# Patient Record
Sex: FEMALE | Race: WHITE | NOT HISPANIC OR LATINO | Employment: FULL TIME | URBAN - METROPOLITAN AREA
[De-identification: names, ages, dates, MRNs, and addresses within clinical notes are randomized per-mention and may not be internally consistent; named-entity substitution may affect disease eponyms.]

---

## 2017-01-26 ENCOUNTER — OFFICE VISIT (OUTPATIENT)
Dept: AUDIOLOGY | Facility: CLINIC | Age: 42
End: 2017-01-26

## 2017-01-26 DIAGNOSIS — H90.3 SENSORINEURAL HEARING LOSS, BILATERAL: Primary | ICD-10-CM

## 2017-01-26 NOTE — PROGRESS NOTES
AUDIOLOGY REPORT    BACKGROUND INFORMATION: Dr. Antwan Silva implanted Sarahi Vasquez with a left  Nucleus N22 cochlear implant (CI) on 11/13/2014 due to bilateral moderately-severe to profound sensorineural hearing loss and lack of benefit from hearing aids.  The patient is being seen for programming 1/26/2017 in Audiology at the [facility].    PATIENT REPORT: Patient reported that she has been hearing very well. She reported she recently switched to her back up processor and it sounds echoy and hurts her ear at times.     METHOD: Speech perception testing is conducted at regular intervals to determine the degree of benefit the patient is obtaining from the cochlear implant. Tests are conducted using the cochlear implant without the benefit of lipreading. All tests are conducted in a sound-treated room. Perception of monosyllabic words and words in sentences are tested. Results usually show improvement over time. A decrease in performance indicates the need for re-programming of the prosthesis and/or replacement of components.     INTERVAL: 2 years months     TEST RESULTS:  25 minutes were spent assessing the patient s auditory rehabilitation status.    Device(s) used for Testing:   Left ear: Cochlear N6 CI    Tympanograms: Normal eardrum mobility in the right ear, restricted (As) in the left ear.    Unaided thresholds: Moderately-severe to profound sensorineural hearing loss right ear. Left ear was not tested due to lack of measurable responses at last audiogram.    Soundfield Aided Thresholds: Aided thresholds in borderline normal hearing range.      CNC Words Test:  The patient repeats 25 single syllable words, auditory only. The words are presented at 60 dB SPL (conversational level) delivered from a CD player.    Preoperative Performance:  Left ear aided: Words= 4%, Phonemes= 21%  Right ear aided: Words= 4%, Phonemes= 19%    3 months Post-Activation of CI:  (4/2/15)  Left: Words= 72%, Phonemes= 84%    6 months Post-Activation of CI: (6/24/15)  Words= 76%, Phonemes= 87%    1 year Post-Activation of CI: (12/30/15)  Words= 84%, phonemes= 89%    2 years Post-Activation of CI: (today)  Words= 84%, phonemes= 92%    AzBio Sentences Test:  The patient repeats 20 sentences, auditory only.  The sentences are presented at 60 dB SPL (conversational level) delivered from a CD player.     Preoperative Performance:  Left ear aided: 4%  Right ear aided: 20%  Bilaterally aided: 43%, +10 dB SNR= 27%    3 months Post-Activation of CI: (4/2/15)  Left: 96%, +10 dB SNR= 83%    6 months Post-Activation of CI: (6/24/15)  Left: 99%, +10 dB SNR= 85%    1 year Post-Activation of CI: (12/30/15)  Left: 100%, +10 dB SNR= 79%    2 years Post-Activation of CI: (today)  Left: 95%, +10 dB SNR= 80%      FITTING SESSION: Dr. Antwan Silva, cochlear implant surgeon, ordered today's appointment. The patient came to the clinic for adjustment to the programs in the external speech processor and for assessment of the external components of the cochlear implant system. These components provide power and data to the internal device. Sound is only heard once the external portion is activated. Postoperative treatment, including device fitting and adjustment, audiologic assessments, and training are required at regular intervals. Testing can include electropsychophysical measures of threshold, comfort, and loudness balancing, which are completed to update the program.    Processor type:   Headpiece type:  Series  Magnet strength: 2    TEST RESULTS:   Electrode Impedances: stable and within tolerances  Neural Response Testing: Not tested today  Facial Stimulation: Absent  Tinnitus: Present  Balance Problems: Absent  Pain/Discomfort: Absent  Strategies Tried:   Strategy Preference:     Programs:  1. #15: Home (ASC + ADRO)  2. #15: Cafe (ASC + ADRO + Zoom)    Number of Channels per Program:  22    COMMENTS: I changed microphone covers on both processors. After doing so Sarahi reported they both had the echoy sound quality. I tilted C's down in the apical electrodes by 7 and decreased C's globally by 3. She reported the echo sound quality and discomfort were resolved. She reported good clarity and comfort with the volume.    Sarahi reported neither of her remotes are working. They are both under warranty. I submitted an RMA for both.      SUMMARY AND RECOMMENDATIONS: Sarahi was seen for cochlear implant programming and speech perception testing today. She will return in 12 months for programming, sooner if concerns arise. Please call this clinic with questions regarding these results or recommendations.    Kathie Wadsworth  Licensed Audiologist  [place] License #[id number]

## 2017-07-08 ENCOUNTER — HEALTH MAINTENANCE LETTER (OUTPATIENT)
Age: 42
End: 2017-07-08

## 2017-11-02 ENCOUNTER — CARE COORDINATION (OUTPATIENT)
Dept: SURGERY | Facility: CLINIC | Age: 42
End: 2017-11-02

## 2017-11-02 NOTE — PROGRESS NOTES
This chart was accessed as a result of referral that was sent to Dr. MARIA ANTONIA Tillman but was attached to the wrong medical record number.  The new patient  was notified and the correct chart was then attached for this referral.    Carol HAIRSTONN, HNBC, STAR-T  RN Care Coordinator  Surgical Oncology and GI service  Ph: [phone]  FAX: [phone]

## 2017-12-21 ENCOUNTER — OFFICE VISIT (OUTPATIENT)
Dept: AUDIOLOGY | Facility: CLINIC | Age: 42
End: 2017-12-21
Payer: COMMERCIAL

## 2017-12-21 DIAGNOSIS — H90.3 SENSORINEURAL HEARING LOSS, BILATERAL: Primary | ICD-10-CM

## 2017-12-21 NOTE — PROGRESS NOTES
AUDIOLOGY REPORT    BACKGROUND INFORMATION: Dr. Antwan Silva implanted Sarahi Vasquez with a left  Nucleus N22 cochlear implant (CI) on 11/13/2014 due to bilateral moderately-severe to profound sensorineural hearing loss and lack of benefit from hearing aids.  The patient is being seen for programming 12/21/2017 in Audiology at the [facility].    PATIENT REPORT: Patient reported that she has been hearing very well. She did not receive the repaired remote assistant as she had moved to a new address. She also reported that she needs a new size small earhook.    METHOD: Speech perception testing is conducted at regular intervals to determine the degree of benefit the patient is obtaining from the cochlear implant. Tests are conducted using the cochlear implant without the benefit of lipreading. All tests are conducted in a sound-treated room. Perception of monosyllabic words and words in sentences are tested. Results usually show improvement over time. A decrease in performance indicates the need for re-programming of the prosthesis and/or replacement of components.     INTERVAL: 3 years     TEST RESULTS:  25 minutes were spent assessing the patient s auditory rehabilitation status.    Device(s) used for Testing:   Left ear: Cochlear N6 CI    Tympanograms: Normal eardrum mobility in the right ear, restricted (As) in the left ear.    Unaided thresholds: Moderately-severe to profound sensorineural hearing loss right ear.     Soundfield Aided Thresholds: Aided thresholds in borderline normal hearing range.      CNC Words Test:  The patient repeats 25 single syllable words, auditory only. The words are presented at 60 dB SPL (conversational level) delivered from a CD player.    Preoperative Performance:  Left ear aided: Words= 4%, Phonemes= 21%  Right ear aided: Words= 4%, Phonemes= 19%    3 months Post-Activation of CI: (4/2/15)  Left: Words= 72%, Phonemes= 84%    6 months  Post-Activation of CI: (6/24/15)  Words= 76%, Phonemes= 87%    1 year Post-Activation of CI: (12/30/15)  Words= 84%, phonemes= 89%    2 years Post-Activation of CI: (1/26/17)  Words= 84%, phonemes= 92%    3 years Post-Activation of CI: (today)  Words= 92%, phonemes= 97%    AzBio Sentences Test:  The patient repeats 20 sentences, auditory only.  The sentences are presented at 60 dB SPL (conversational level) delivered from a CD player.     Preoperative Performance:  Left ear aided: 4%  Right ear aided: 20%  Bilaterally aided: 43%, +10 dB SNR= 27%    3 months Post-Activation of CI: (4/2/15)  Left: 96%, +10 dB SNR= 83%    6 months Post-Activation of CI: (6/24/15)  Left: 99%, +10 dB SNR= 85%    1 year Post-Activation of CI: (12/30/15)  Left: 100%, +10 dB SNR= 79%    2 years Post-Activation of CI: (1/26/17)  Left: 95%, +10 dB SNR= 80%    3 years Post-Activation of CI: (today)  Left: 96%, +10 dB SNR= 90%      FITTING SESSION: Dr. Antwan Silva, cochlear implant surgeon, ordered today's appointment. The patient came to the clinic for adjustment to the programs in the external speech processor and for assessment of the external components of the cochlear implant system. These components provide power and data to the internal device. Sound is only heard once the external portion is activated. Postoperative treatment, including device fitting and adjustment, audiologic assessments, and training are required at regular intervals. Testing can include electropsychophysical measures of threshold, comfort, and loudness balancing, which are completed to update the program.    Processor type:   Headpiece type:  Series  Magnet strength: 2    TEST RESULTS:   Electrode Impedances: stable and within tolerances  Neural Response Testing: Not tested today  Facial Stimulation: Absent  Tinnitus: Present  Balance Problems: Absent  Pain/Discomfort: Absent  Strategies Tried:   Strategy Preference:     Programs:  1. #15:  Home (ASC + ADRO)  2. #15: Cafe (ASC + ADRO + Zoom)    Number of Channels per Program: 22    COMMENTS: I changed microphone covers on both processors.    Due to speech perception results and patient request, no programming changes were made to her processor today. She reports excellent sound quality.      SUMMARY AND RECOMMENDATIONS: Sarahi was seen for cochlear implant programming and speech perception testing today. She will return in 12 months for programming, sooner if concerns arise. Please call this clinic with questions regarding these results or recommendations.    Kathie Wadsworth  Licensed Audiologist  [place] License #[id number]

## 2017-12-21 NOTE — MR AVS SNAPSHOT
After Visit Summary   12/21/2017    Sarahi Vasquez    MRN: [id number]           Patient Information     Date Of Birth          1975        Visit Information        Provider Department      12/21/2017 10:00 AM Meg Haley, [facility] Audiology        Today's Diagnoses     Sensorineural hearing loss, bilateral    -  1       Follow-ups after your visit        Who to contact     Please call your clinic at [phone] to:    Ask questions about your health    Make or cancel appointments    Discuss your medicines    Learn about your test results    Speak to your doctor   If you have compliments or concerns about an experience at your clinic, or if you wish to file a complaint, please contact [facility] Patient Relations at [phone] or email us at [email or web address]         Additional Information About Your Visit        MyChart Information     [employer] gives you secure access to your electronic health record. If you see a primary care provider, you can also send messages to your care team and make appointments. If you have questions, please call your primary care clinic.  If you do not have a primary care provider, please call [phone] and they will assist you.      [employer]Card is an electronic gateway that provides easy, online access to your medical records. With [employer], you can request a clinic appointment, read your test results, renew a prescription or communicate with your care team.     To access your existing account, please contact your [facility] or call [phone] for assistance.        Care EveryWhere ID     This is your Care EveryWhere ID. This could be used by other organizations to access your [facility] medical records  [id number]         Blood Pressure from Last 3 Encounters:   11/13/14 129/82    Weight from Last 3 Encounters:   11/13/14 68.1 kg (150 lb 2.1 oz)              We Performed the Following      AUDIOGRAM/TYMPANOGRAM - INTERFACE     Audiometry/Air   (06306)     Eval of Aural Rehab Status (less than 31 min) (79687)     LT: Diagnostic Analysis of CI 7 yrs & over, Subsequent Programming   (42737)     Tympanometry (impedance - testing) (52652)        Primary Care Provider Office Phone # Fax #    Trey Ram [phone]       [place] [address]        Equal Access to Services     TIM LIU AH: Hadii aad ku hadasho Soomaali, waaxda luqadaha, qaybta kaalmada adeegyada, waxay idiin hayaan adeeg kharash la'aan ah. So [facility] [phone].    ATENCIÓN: Si habla español, tiene a [place] disposición servicios gratuitos de asistencia lingüística. Ll[facility]zer [facility] System [phone].    We comply with applicable federal civil rights laws and [place] laws. We do not discriminate on the basis of race, color, national origin, age, disability, sex, sexual orientation, or gender identity.            Thank you!     Thank you for choosing [facility] AUDIOLOGY  for your care. Our goal is always to provide you with excellent care. Hearing back from our patients is one way we can continue to improve our services. Please take a few minutes to complete the written survey that you may receive in the mail after your visit with us. Thank you!             Your Updated Medication List - Protect others around you: Learn how to safely use, store and throw away your medicines at www.disposemymeds.org.          This list is accurate as of: 12/21/17  3:35 PM.  Always use your most recent med list.                   Brand Name Dispense Instructions for use Diagnosis    fish oil-omega-3 fatty acids 1000 MG capsule      Take 2 g by mouth daily.        IBUPROFEN      as needed.        IRON SUPPLEMENT PO           LEXAPRO 20 MG tablet   Generic drug:  escitalopram      Take 20 mg by mouth daily.        MULTIVITAMINS PO      Take  by mouth daily.        nicotine      Place onto the skin every 8 hours         ondansetron 4 MG ODT tab    ZOFRAN ODT    20 tablet    Take 1-2 tablets (4-8 mg) by mouth every 8 hours as needed for nausea    Post-operative state       WELLBUTRIN PO

## 2018-12-26 ENCOUNTER — OFFICE VISIT (OUTPATIENT)
Dept: AUDIOLOGY | Facility: CLINIC | Age: 43
End: 2018-12-26
Payer: COMMERCIAL

## 2018-12-26 DIAGNOSIS — H90.3 SENSORINEURAL HEARING LOSS, BILATERAL: Primary | ICD-10-CM

## 2018-12-26 NOTE — PROGRESS NOTES
AUDIOLOGY REPORT    BACKGROUND INFORMATION: Dr. Antwan Silva implanted Sarahi Vasquez with a left  Nucleus N22 cochlear implant (CI) on 11/13/2014 due to bilateral moderately-severe to profound sensorineural hearing loss and lack of benefit from hearing aids.  The patient is being seen for programming 12/26/18 in Audiology at the [facility].    PATIENT REPORT: Patient reported that she has been doing very well with her cochlear implant. She does not have any specific complains regarding sound quality or volume. She is wearing is full time.       FITTING SESSION: Dr. Antwan Silva, cochlear implant surgeon, ordered today's appointment. The patient came to the clinic for adjustment to the programs in the external speech processor and for assessment of the external components of the cochlear implant system. These components provide power and data to the internal device. Sound is only heard once the external portion is activated. Postoperative treatment, including device fitting and adjustment, audiologic assessments, and training are required at regular intervals. Testing can include electropsychophysical measures of threshold, comfort, and loudness balancing, which are completed to update the program.    Processor type:   Headpiece type:  Series  Magnet strength: 2    TEST RESULTS:   Electrode Impedances: stable and within tolerances  Neural Response Testing: Not tested today  Facial Stimulation: Absent  Tinnitus: Present  Balance Problems: Absent  Pain/Discomfort: Absent  Strategies Tried:   Strategy Preference:     Programs:  1. #16: Home (ASC + ADRO)  2. #16: Cafe (ASC + ADRO + Zoom)    Number of Channels per Program: 22    COMMENTS: I changed microphone covers on both processors.    Patient's appointment was scheduled for 30 minutes today therefore no speech perception testing was completed. After checking compliance, multiple electrodes were  out of compliance therefore pulse width was widened to 37 and T levels were remeasured using detection and c levels were measured using loudness scaling. Patient was able to complete task successfully. In live voice she reported comfort with the volume and sound quality. All electrodes were back in compliance.      SUMMARY AND RECOMMENDATIONS: Sarahi was seen for cochlear implant programming and speech perception testing today. She will return in 12 months for programming, sooner if concerns arise. Please call this clinic with questions regarding these results or recommendations.    Kathie Wadsworth, [facility]  Licensed Audiologist  [place] License #[id number]

## 2019-11-02 ENCOUNTER — HEALTH MAINTENANCE LETTER (OUTPATIENT)
Age: 44
End: 2019-11-02

## 2020-02-21 ENCOUNTER — DOCUMENTATION ONLY (OUTPATIENT)
Dept: OTOLARYNGOLOGY | Facility: CLINIC | Age: 45
End: 2020-02-21

## 2020-03-19 ENCOUNTER — TELEPHONE (OUTPATIENT)
Dept: AUDIOLOGY | Facility: CLINIC | Age: 45
End: 2020-03-19

## 2020-03-19 NOTE — TELEPHONE ENCOUNTER
Reached out to patient regarding upcoming appointment. Informed them that per leadership it is in their best interest to reschedule appointment until after 4/12 and gave the appointment line to reschedule.     Kathie Wadsworth, [facility]  Licensed Audiologist  [place] License #[id number]

## 2020-07-08 ENCOUNTER — OFFICE VISIT (OUTPATIENT)
Dept: AUDIOLOGY | Facility: CLINIC | Age: 45
End: 2020-07-08
Payer: COMMERCIAL

## 2020-07-08 DIAGNOSIS — H90.3 SENSORINEURAL HEARING LOSS, BILATERAL: Primary | ICD-10-CM

## 2020-07-08 NOTE — PROGRESS NOTES
"AUDIOLOGY REPORT    BACKGROUND INFORMATION: Dr. Antwan Silva implanted Sarahi Vasquez with a left  Nucleus N22 cochlear implant (CI) on 11/13/2014 due to bilateral moderately-severe to profound sensorineural hearing loss and lack of benefit from hearing aids.  The patient is being seen to upgrade her external equipment on 7/8/2020 in Audiology at the [facility].    PATIENT REPORT: Patient reported that she received her left N7 upgrade kit in the mail. She has not had any issues with sound quality or volume with current map.    FITTING SESSION: Dr. Antwan Silva, cochlear implant surgeon, ordered today's appointment. The patient came to the clinic for adjustment to the programs in the external speech processor and for assessment of the external components of the cochlear implant system. These components provide power and data to the internal device. Sound is only heard once the external portion is activated. Postoperative treatment, including device fitting and adjustment, audiologic assessments, and training are required at regular intervals. Testing can include electropsychophysical measures of threshold, comfort, and loudness balancing, which are completed to update the program.    Processor type: [id number]  Headpiece type: [id number] Series  Magnet strength: 2    TEST RESULTS:   Electrode Impedances: stable and within tolerances  Neural Response Testing: Not tested today  Facial Stimulation: Absent  Tinnitus: Present  Balance Problems: Absent  Pain/Discomfort: Absent  Strategies Tried:   Strategy Preference:     Programs:  1. #16: Home (ASC + ADRO)  2. #16: Cafe (ASC + ADRO + Zoom)  3. #16: Scan    Number of Channels per Program: 22    COMMENTS: I converted her previous map to be used with the N7 and she reported that sound quality was \"sharper\" but was happy with the change. We paired the processor to her phone and I explained using Scan and Front Focus if " she chooses.     SUMMARY AND RECOMMENDATIONS: Sarahi was seen to update her external equipment today. She will return in 6 weeks for a check, or in 12 months for programming if no concerns arise, sooner if concerns arise. Please call this clinic with questions regarding these results or recommendations.    Kathie Wadsworth, [facility]  Licensed Audiologist  [place] License #[id number]

## 2020-11-16 ENCOUNTER — HEALTH MAINTENANCE LETTER (OUTPATIENT)
Age: 45
End: 2020-11-16

## 2021-09-12 ENCOUNTER — HEALTH MAINTENANCE LETTER (OUTPATIENT)
Age: 46
End: 2021-09-12

## 2021-11-03 DIAGNOSIS — H90.3 SENSORINEURAL HEARING LOSS, BILATERAL: Primary | ICD-10-CM

## 2021-11-05 ENCOUNTER — TELEPHONE (OUTPATIENT)
Dept: AUDIOLOGY | Facility: CLINIC | Age: 46
End: 2021-11-05

## 2022-01-02 ENCOUNTER — HEALTH MAINTENANCE LETTER (OUTPATIENT)
Age: 47
End: 2022-01-02

## 2022-01-20 ENCOUNTER — OFFICE VISIT (OUTPATIENT)
Dept: AUDIOLOGY | Facility: CLINIC | Age: 47
End: 2022-01-20
Payer: COMMERCIAL

## 2022-01-20 DIAGNOSIS — H90.3 SENSORINEURAL HEARING LOSS, BILATERAL: Primary | ICD-10-CM

## 2022-01-20 PROCEDURE — 92604 REPROGRAM COCHLEAR IMPLT 7/>: CPT | Performed by: AUDIOLOGIST

## 2022-01-20 PROCEDURE — 92626 EVAL AUD FUNCJ 1ST HOUR: CPT | Mod: 59 | Performed by: AUDIOLOGIST

## 2022-01-20 PROCEDURE — 92567 TYMPANOMETRY: CPT | Mod: 59 | Performed by: AUDIOLOGIST

## 2022-01-20 NOTE — PROGRESS NOTES
AUDIOLOGY REPORT    BACKGROUND INFORMATION: Dr. Antwan Silva implanted Sarahi Vasquez with a left Cochlear [employer]us N22 cochlear implant (CI) on 11/13/2014 due to bilateral moderately severe to profound sensorineural hearing loss and lack of benefit from hearing aids. The patient is being seen for audiologic evaluation, speech perception testing, and CI follow-up programing on 1/20/2022 in the Audiology Clinic at [facility].     PATIENT REPORT: Sarahi reports that she loves the N7 sound processor. She does note that sounds will intermittently get too loud and echo-y. She also reports her battery life has seemed to diminish some.     METHOD: Speech perception testing is conducted at regular intervals to determine the degree of benefit the patient is obtaining from the CI. Tests are conducted using the CI without the benefit of lipreading. All tests are conducted in a sound-treated room. Perception of monosyllabic words and words in sentences are tested. Results usually show improvement over time. A decrease in performance indicates the need for re-programming of the prosthesis and/or replacement of components.     INTERVAL: 7 years    TEST RESULTS: 40 minutes were spent assessing the patient s auditory rehabilitation status.    Devices used for Testing:   Right ear: N/A, as the patient does not wear a hearing aid  Left ear: N7 sound processor    Tympanograms: Within normal limits bilaterally    Unaided Thresholds: Moderately severe sloping to profound sensorineural hearing loss for the right ear. Did not test the left ear due to previous no response    Soundfield Aided Thresholds: Detection in the normal to mild range    CNC Words Test:  The patient repeats 25 single syllable words, auditory only. The words are presented at 60 dB SPL (conversational level) delivered from a CD player.    Preoperative Performance:  Left: 4% words, 21% phonemes  Right: 4% words,  19% phonemes    3 months Post-Activation of CI:   Left: 72% words, 84% phonemes    6 months Post-Activation of CI:  Left: 76% words, 87% phonemes    1 year Post-Activation of CI:  Left: 84% words, 89% phonemes    2 years Post-Activation of CI:  Left: 84% words, 92% phonemes    3 years Post-Activation of CI:  Left: 92% words, 97% phonemes    7 years Post-Activation of CI:  Left: 88% words, 95% phonemes    AzBio Sentences Test:  The patient repeats 20 sentences, auditory only. The sentences are presented at 60 dB SPL (conversational level) delivered from a CD player.     Preoperative Performance:  Left: 4%  Right: 20%  Bilateral: 43% in quiet, 27% in +10 dB SNR background babble    3 months Post-Activation of CI:   Left: 96% in quiet, 83% in +10 dB SNR background babble    6 months Post-Activation of CI:  Left: 99% in quiet, 83% in +10 dB SNR background babble    1 year Post-Activation of CI:  Left: 100% in quiet, 79% in +10 dB SNR background babble    2 years Post-Activation of CI:  Left: 95% in quiet, 80% in +10 dB SNR background babble    3 years Post-Activation of CI:  Left: 96% in quiet, 90% in +10 dB SNR background babble    7 years Post-Activation of CI:  Left: 95% in quiet, 67% in +10 dB SNR background babble pre-programming and 76% in +10 dB SNR background babble post-programming    FITTING SESSION: Dr. Mica Sherman, CI surgeon, ordered today's appointment. The patient came to the clinic for adjustment to the programs in the external speech processor and for assessment of the external components of the CI system. These components provide power and data to the internal device. Sound is only heard once the external portion is activated. Postoperative treatment, including device fitting and adjustment, audiologic assessments, and training are required at regular intervals. Testing can include electropsychophysical measures of threshold, comfort, and loudness balancing, which are completed to update the  program.    Processor type: N7  Magnet strength: 2 - No irritation    TEST RESULTS:   Electrode Impedances: Stable and within tolerances  Neural Response Testing: Did not test  Facial Stimulation: Absent  Tinnitus: Present  Balance Problems: Absent  Pain/Discomfort: Absent  Strategies Tried:  with pulse width of 62    Programs (MAP 17):  1. Home  2. Cafe  3. SCAN    Number of Channels per Program: 22    COMMENTS: Unaided thresholds were stable for the right ear compared to the patient's previous audiogram dated 12/21/2017. Audibility was good. Speech perception scores were stable, with the exception of a slight decrease in performance on AzBio in +10 dB SNR background babble.     A few of the mid-frequency electrodes were out of compliance. Therefore, pulse width was changed to 62. Threshold (T) levels and comfort (C) levels were re-measured. T levels decreased slightly and C levels increased slightly across the electrode array. Upon going live, Sarahi reported good volume and sound quality.     Confirmed audibility remained good post-programming. AzBio in +10 dB SNR background babble was completed again post-programming and scores improved some.     SUMMARY AND RECOMMENDATIONS: Sarahi was seen for audiologic evaluation, speech perception testing, and CI follow-up programing today. She will return annually, sooner if concerns arise. Please call this clinic with questions regarding these results or recommendations.      Lc Scherer, [facility]  Licensed Audiologist  [place] #[id number]

## 2022-11-19 ENCOUNTER — HEALTH MAINTENANCE LETTER (OUTPATIENT)
Age: 47
End: 2022-11-19

## 2022-12-18 ENCOUNTER — HEALTH MAINTENANCE LETTER (OUTPATIENT)
Age: 47
End: 2022-12-18

## 2023-01-16 DIAGNOSIS — H90.3 SENSORINEURAL HEARING LOSS, BILATERAL: Primary | ICD-10-CM

## 2023-02-06 NOTE — PROGRESS NOTES
AUDIOLOGY REPORT    BACKGROUND INFORMATION: Dr. Antwan Silva implanted Sarahi Vasquez ([place] - patient reports she will be changing her last name at checkout today) with a left Cochlear [employer] Nucle[employer]  (Note: previous clinic notes listed N22 device in error) cochlear implant (CI) on 11/13/2014 due to bilateral moderately severe to profound sensorineural hearing loss and lack of benefit from hearing aids. The patient is being seen for audiologic evaluation, speech perception testing, and CI follow-up on 2/16/2023 in the Audiology Clinic at [facility].     Dr. Mica Sherman ordered today's visit.      PATIENT REPORT: Sarahi reports she is doing well with her N7 processor and has no new concerns.      It was noted that the cable/coil rubber was .  Since it is under warranty, an RMA was submitted and replacement will ship to the patient's home.      METHOD: Speech perception testing is conducted at regular intervals to determine the degree of benefit the patient is obtaining from the CI. Tests are conducted using the CI without the benefit of lipreading. All tests are conducted in a sound-treated room. Perception of monosyllabic words and words in sentences are tested. Results usually show improvement over time. A decrease in performance indicates the need for re-programming of the prosthesis and/or replacement of components.     INTERVAL: 8 years    TEST RESULTS: 40 minutes were spent assessing the patient s auditory rehabilitation status.    Devices used for Testing:   Right ear: N/A, as the patient does not wear a hearing aid  Left ear: N7 sound processor with #2 magnet (no irritation or discomfort)  *Microphone cover was removed for testing since the patient has never changed this.  She was advised on how to change this at home.      Tympanograms:   Right normal  Left slightly shallow, consistent with past recordings and no otologic symptoms      Unaided Thresholds: Severe sloping to profound hearing loss for the right ear - stable re: 1/20/22; Known sensorineural - Did not retest bone since there was no response in past except at 75 dB at 1000 Hz;  Did not test the left ear due to previous no response    Soundfield Aided Thresholds with left CI: Detection in borderline normal to mild range - stable     CNC Words Test:  The patient repeats 25 single syllable words, auditory only. The words are presented at 60 dB SPL (conversational level) delivered from a CD player.    Preoperative Performance:  Left: 4% words, 21% phonemes  Right: 4% words, 19% phonemes    3 months Post-Activation of CI:   Left: 72% words, 84% phonemes    6 months Post-Activation of CI:  Left: 76% words, 87% phonemes    1 year Post-Activation of CI:  Left: 84% words, 89% phonemes    2 years Post-Activation of CI:  Left: 84% words, 92% phonemes    3 years Post-Activation of CI:  Left: 92% words, 97% phonemes    7 years Post-Activation of CI:  Left: 88% words, 95% phonemes    8 years Post-Activation of left CI (today):  Left: 88% words - stable     AzBio Sentences Test:  The patient repeats 20 sentences, auditory only. The sentences are presented at 60 dB SPL (conversational level) delivered from a CD player.     Preoperative Performance:  Left: 4%  Right: 20%  Bilateral: 43% in quiet, 27% in +10 dB SNR background babble    3 months Post-Activation of CI:   Left: 96% in quiet, 83% in +10 dB SNR background babble    6 months Post-Activation of CI:  Left: 99% in quiet, 83% in +10 dB SNR background babble    1 year Post-Activation of CI:  Left: 100% in quiet, 79% in +10 dB SNR background babble    2 years Post-Activation of CI:  Left: 95% in quiet, 80% in +10 dB SNR background babble    3 years Post-Activation of CI:  Left: 96% in quiet, 90% in +10 dB SNR background babble    7 years Post-Activation of CI:  Left: 95% in quiet, 67% in +10 dB SNR background babble pre-programming and 76% in  +10 dB SNR background babble post-programming    8 years Post-Activation of left CI (today):  Left: 99% in quiet, 75% in +10 dB SNR background babble - both stable     FITTING SESSION: Dr. Mica Sherman ordered today's appointment. The patient came to the clinic for adjustment to the programs in the external speech processor and for assessment of the external components of the CI system. These components provide power and data to the internal device. Sound is only heard once the external portion is activated. Postoperative treatment, including device fitting and adjustment, audiologic assessments, and training are required at regular intervals. Testing can include electropsychophysical measures of threshold, comfort, and loudness balancing, which are completed to update the program.    Processor type: N7  Magnet strength: 2 - No irritation    TEST RESULTS:   Electrode Impedances: Stable and within tolerances  Dataloggin.4 hours of use per day   Neural Response Testing: Did not test  Facial Stimulation: Absent  Tinnitus: Present  Balance Problems: Absent  Pain/Discomfort: Absent  Strategies Tried:  with pulse width of 62    Programs (MAP 17):  1. Home  2. Cafe  3. SCAN    Number of Channels per Program: 22    COMMENTS: Impedances were stable.  Compliance was maintained in all programs.  Based on this and the flores testing results, no programming changes were recommended.  Since only datalogging and impedances were checked, programming charges were modified.      SUMMARY AND RECOMMENDATIONS: Sarahi was seen for audiologic evaluation, speech perception testing, and CI follow-up today. Audibility and speech perception testing is stable with the left CI. Impedances were stable and maps remained in compliance. Patient should return in 1 year for follow up testing or sooner if concerns.  While scheduling that at checkout today, she will update her last name with the clinic.  She will also contact [employer] to update her last name with them and she will change her processor microphone cover upon returning home.      Unaided hearing is stable in the right ear.  Tympanograms were consistent with past recordings (right normal, left slightly shallow without otologic symptoms). Patient will follow up in ENT if any medical concerns arise with her ears or cochlear implant.     The patient expressed understanding and agreement with this plan.    Lc Delcid, CCC-A, [facility]  Licensed Audiologist  [place] #[id number]    Enclosure: audiogram

## 2023-02-16 ENCOUNTER — OFFICE VISIT (OUTPATIENT)
Dept: AUDIOLOGY | Facility: CLINIC | Age: 48
End: 2023-02-16
Payer: COMMERCIAL

## 2023-02-16 DIAGNOSIS — H90.3 SENSORINEURAL HEARING LOSS, BILATERAL: Primary | ICD-10-CM

## 2023-02-16 PROCEDURE — 92567 TYMPANOMETRY: CPT | Mod: 59 | Performed by: AUDIOLOGIST-HEARING AID FITTER

## 2023-02-16 PROCEDURE — 92626 EVAL AUD FUNCJ 1ST HOUR: CPT | Mod: 59 | Performed by: AUDIOLOGIST-HEARING AID FITTER

## 2023-02-16 PROCEDURE — 92604 REPROGRAM COCHLEAR IMPLT 7/>: CPT | Performed by: AUDIOLOGIST-HEARING AID FITTER

## 2024-01-28 ENCOUNTER — HEALTH MAINTENANCE LETTER (OUTPATIENT)
Age: 49
End: 2024-01-28

## 2024-02-22 ENCOUNTER — OFFICE VISIT (OUTPATIENT)
Dept: AUDIOLOGY | Facility: CLINIC | Age: 49
End: 2024-02-22
Payer: COMMERCIAL

## 2024-02-22 DIAGNOSIS — H90.3 SENSORINEURAL HEARING LOSS, BILATERAL: Primary | ICD-10-CM

## 2024-02-22 PROCEDURE — 92567 TYMPANOMETRY: CPT | Mod: 59 | Performed by: AUDIOLOGIST

## 2024-02-22 PROCEDURE — 92604 REPROGRAM COCHLEAR IMPLT 7/>: CPT | Mod: 52 | Performed by: AUDIOLOGIST

## 2024-02-22 PROCEDURE — 92626 EVAL AUD FUNCJ 1ST HOUR: CPT | Mod: 59 | Performed by: AUDIOLOGIST

## 2024-02-22 NOTE — PROGRESS NOTES
AUDIOLOGY REPORT    SUBJECTIVE:   Dr. Antwan Silva implanted Sarahi Sofia with a left Cochlear [employer] Nucleus  (Note: previous clinic notes listed N22 device in error) cochlear implant (CI) on 11/13/2014 due to bilateral moderately severe to profound sensorineural hearing loss and lack of benefit from hearing aids. The patient is being seen for speech perception testing and cochlear implant programming on 2/22/24 in the Audiology Clinic at [facility].     Venus reports she is doing well with her processors. She had to have it replaced one time a few months ago after she washed her processor. Today she expressed interest in implanting her second side as she feels she is missing out on a lot in her environment and her kids are older now.    OBJECTIVE:   Speech perception testing is conducted at regular intervals to determine the degree of benefit the patient is obtaining from the CI. Tests are conducted using the CI without the benefit of lipreading. All tests are conducted in a sound-treated room. Perception of monosyllabic words and words in sentences are tested. Results usually show improvement over time. A decrease in performance indicates the need for re-programming of the prosthesis and/or replacement of components.     INTERVAL: 9 years    TEST RESULTS: 40 minutes were spent assessing the patient s auditory rehabilitation status.    Devices used for Testing:   Right ear: [facility] [employer] Link M hearing aid with comply tip. Hearing aid was programmed using simulated real ear measurements to NAL-NL1 prescriptive targets  Left ear: N7     Tympanograms:   Right: Normal  Left: Restricted mobility consistent with past recordings and no otologic symptoms     Unaided Thresholds: Severe sloping to profound hearing loss for the right ear - stable re: 1/20/22; Known sensorineural - Did not retest bone since there was no response in past except at 75 dB at 1000  Hz;  Did not test the left ear due to previous no response    Soundfield Aided Thresholds with left CI: Detection in borderline normal to mild range - stable     CNC Words Test:  The patient repeats 25 single syllable words, auditory only. The words are presented at 60 dB SPL (conversational level) delivered from a CD player.    Preoperative Performance:  Left: 4% words, 21% phonemes  Right: 4% words, 19% phonemes    7 years Post-Activation of CI:  Left: 88% words, 95% phonemes    8 years Post-Activation of left CI (2/16/23)  Left: 88% words - stable       9 years Post-Activation of left CI (today):  Left CI: words: 88%, phonemes: 95%  Right HA: words: 4%, phonemes: 17%    AzBio Sentences Test:  The patient repeats 20 sentences, auditory only. The sentences are presented at 60 dB SPL (conversational level) delivered from a CD player.     Preoperative Performance:  Left: 4%  Right: 20%  Bilateral: 43% in quiet, 27% in +10 dB SNR background babble    7 years Post-Activation of CI:  Left: 95% in quiet, 67% in +10 dB SNR background babble pre-programming and 76% in +10 dB SNR background babble post-programming    8 years Post-Activation of left CI (2/16/23):  Left: 99% in quiet, 75% in +10 dB SNR background babble      9 years Post-Activation of left CI (today):  Left: 95%  Right HA: 9%  Bimodal: 93%, +10 dB SNR: 89%    FITTING SESSION: Dr. Mica Sherman ordered today's appointment. The patient came to the clinic for adjustment to the programs in the external speech processor and for assessment of the external components of the CI system. These components provide power and data to the internal device. Sound is only heard once the external portion is activated. Postoperative treatment, including device fitting and adjustment, audiologic assessments, and training are required at regular intervals. Testing can include electropsychophysical measures of threshold, comfort, and loudness balancing, which are completed to update  the program.    Processor type: N7  Magnet strength: 2 - No irritation    TEST RESULTS:   Electrode Impedances: Stable and within tolerances  Dataloggin.4 hours of use per day   Neural Response Testing: Did not test  Facial Stimulation: Absent  Tinnitus: Present  Balance Problems: Absent  Pain/Discomfort: Absent  Strategies Tried:  with pulse width of 62    Programs (MAP 17):  1. Home  2. Cafe  3. SCAN    Number of Channels per Program: 22    COMMENTS: Impedances were stable.  Compliance was maintained in all programs.  Based on this and the flores testing results, no programming changes were recommended.      We discussed expectations around implanting her second side. I explained that progress may be different than her first side. Unlike with her first side when we encouraged discontinuing use of the hearing aid in the other ear, she should continue to wear both processors full time after activation of the new one, however dedicated listening practice of the new ear should be done daily in the first few months in order to optimize performance. We also discussed that sound quality may be different with second side which is normal. She expressed understanding.    ASSESSMENT:   Speech perception testing, aided testing and modified cochlear implant programming completed today. Patient is a candidate for cochlear implantation in the right ear and performance in left CI is stable.    PLAN:   Sarahi has been diagnosed with a bilateral sensorineural hearing loss and is a candidate for cochlear implantation in the right ear ear. It is recommended that they return to complete the remainder of the team evaluation; CT scan and surgeon visit. Please contact the clinic at [phone] with questions regarding these results or recommendations.    Kathie Wadsworth, [facility] [place]  Licensed Audiologist  [place] License #[id number]

## 2024-03-01 ENCOUNTER — TELEPHONE (OUTPATIENT)
Dept: AUDIOLOGY | Facility: CLINIC | Age: 49
End: 2024-03-01
Payer: COMMERCIAL

## 2024-03-01 NOTE — TELEPHONE ENCOUNTER
FUTURE VISIT INFORMATION      FUTURE VISIT INFORMATION:  Date: 4/18/24  Time: 1 PM  Location: [facility]  REFERRAL INFORMATION:  Referring provider:    Referring providers clinic:    Reason for visit/diagnosis:  Discuss 2nd CI     RECORDS REQUESTED FROM      Clinic name Comments Records Status Imaging Status   [facility] 2/22/2024 OV with Meg Haley AuD   2/22/2024 audiogram  [id number] audiogram    12/5/2014 OV with Antwan Silva MD    [facility]    Procedure [facility] 11/13/2014 Left Cochlear Allie Implant with Antwan Silva MD   [facility] 9/17/2014 CT temporal Epic PACS

## 2024-03-01 NOTE — TELEPHONE ENCOUNTER
Spoke with Venus, scheduled appt and noted I would send her contact info to Zhane North with [employer] to discuss ordering device accessories    -Briana SHAVER  1:09pm  ___    LVM for patient with direct # to schedule for Dr. Sherman/CI discussion.    -Briana SHAVER 3/1/24

## 2024-03-01 NOTE — TELEPHONE ENCOUNTER
[facility] Call Center    Phone Message    May a detailed message be left on voicemail: yes     Reason for Call: Per pt she is wondering about getting her surgery scheduled. Please call to discuss. Thank you    Action Taken: Message routed to:  [facility] ([facility]): AUDIO    Travel Screening: Not Applicable

## 2024-04-18 ENCOUNTER — PRE VISIT (OUTPATIENT)
Dept: OTOLARYNGOLOGY | Facility: CLINIC | Age: 49
End: 2024-04-18

## 2024-04-18 ENCOUNTER — OFFICE VISIT (OUTPATIENT)
Dept: OTOLARYNGOLOGY | Facility: CLINIC | Age: 49
End: 2024-04-18
Payer: COMMERCIAL

## 2024-04-18 VITALS
DIASTOLIC BLOOD PRESSURE: 71 MMHG | OXYGEN SATURATION: 95 % | TEMPERATURE: 97.8 F | WEIGHT: 198 LBS | HEIGHT: 65 IN | HEART RATE: 78 BPM | BODY MASS INDEX: 32.99 KG/M2 | SYSTOLIC BLOOD PRESSURE: 103 MMHG

## 2024-04-18 DIAGNOSIS — H90.3 SENSORINEURAL HEARING LOSS, BILATERAL: Primary | ICD-10-CM

## 2024-04-18 PROCEDURE — 99203 OFFICE O/P NEW LOW 30 MIN: CPT | Performed by: OTOLARYNGOLOGY

## 2024-04-18 RX ORDER — ROPINIROLE 0.5 MG/1
TABLET, FILM COATED ORAL
COMMUNITY

## 2024-04-18 RX ORDER — ACETAMINOPHEN 325 MG/1
975 TABLET ORAL ONCE
Status: CANCELLED | OUTPATIENT
Start: 2024-04-18 | End: 2024-04-18

## 2024-04-18 RX ORDER — TRAZODONE HYDROCHLORIDE 100 MG/1
TABLET ORAL
COMMUNITY

## 2024-04-18 RX ORDER — FLUOXETINE 40 MG/1
CAPSULE ORAL
COMMUNITY

## 2024-04-18 RX ORDER — CEFAZOLIN SODIUM 2 G/50ML
2 SOLUTION INTRAVENOUS
Status: CANCELLED | OUTPATIENT
Start: 2024-04-18

## 2024-04-18 RX ORDER — CEFAZOLIN SODIUM 2 G/50ML
2 SOLUTION INTRAVENOUS SEE ADMIN INSTRUCTIONS
Status: CANCELLED | OUTPATIENT
Start: 2024-04-18

## 2024-04-18 RX ORDER — CETIRIZINE HYDROCHLORIDE 10 MG/1
1 TABLET ORAL DAILY
COMMUNITY
Start: 2022-04-14

## 2024-04-18 RX ORDER — CHOLECALCIFEROL (VITAMIN D3) 50 MCG
TABLET ORAL
COMMUNITY
Start: 2024-04-14

## 2024-04-18 RX ORDER — DEXAMETHASONE SODIUM PHOSPHATE 4 MG/ML
10 INJECTION, SOLUTION INTRA-ARTICULAR; INTRALESIONAL; INTRAMUSCULAR; INTRAVENOUS; SOFT TISSUE ONCE
Status: CANCELLED | OUTPATIENT
Start: 2024-04-18 | End: 2024-04-18

## 2024-04-18 ASSESSMENT — PAIN SCALES - GENERAL: PAINLEVEL: NO PAIN (0)

## 2024-04-18 NOTE — LETTER
4/18/2024      RE: Sarahi Sofia  [id number]  Formerly [facility] [address]       Sarahi Sofia is a new patient to our clinic as she has not been seen since 2014. She is a 48 year old female being seen to discuss a second cochlear implant. She is excited about getting this done. She has done well with the left side which was placed in 2014. She has had no issues with either ear - no otalgia, otorrhea or vertigo.      Past Medical History:   Diagnosis Date     Anemia      Anxiety 2008    Dx 2008     Breathing difficult      Cancer (H) Dec 2022    Squamous cell     Constipations      Depression      Depressive disorder 1994    Dx in 2008     Diarrhea      Headache(784.0)      Hearing loss      Itchy eyes     itchy nose and throat     Nasal congestion      Rash      Rash      Ringing in ears      Sensorineural hearing loss 1994    Bilateral     Sneezing      Snoring      Sore throat        Past Surgical History:   Procedure Laterality Date     broken ankle       IMPLANT COCHLEA WITH NERVE INTEGRITY MONITOR Left 11/13/2014    Procedure: IMPLANT COCHLEA WITH NERVE INTEGRITY MONITOR;  Surgeon: Antwan Silva MD;  Location: [facility] OR     ORTHOPEDIC SURGERY  2009    Ankle fractures     removal of ankle hardware       TONSILLECTOMY       TONSILLECTOMY  1982    Na       Family History   Problem Relation Age of Onset     Diabetes Father         N     Hypertension Father         N     Alcohol/Drug Father      Depression Father      Substance Abuse Father      Diabetes Maternal Grandfather         N     Alcohol/Drug Paternal Grandfather      Cardiovascular Paternal Grandfather      Eye Disorder Brother      Depression Brother      Asthma Brother      Gastrointestinal Disease Brother      Diabetes Brother         N     Asthma Brother      Depression Brother      Substance Abuse Brother        Social History     Tobacco Use     Smoking status: Some Days     Current packs/day: 0.00     Average packs/day: 0.5 packs/day for  "18.0 years (9.0 ttl pk-yrs)     Types: Cigarettes     Start date: 1994     Last attempt to quit: 2012     Years since quittin.4     Smokeless tobacco: Never   Substance Use Topics     Alcohol use: Not Currently     Comment: occ     Drug use: Never       Patient Supplied Answers to Review of Systems      2024     6:27 AM    ENT ROS   Psychology Frequently feeling depressed or sad   The remainder of the 10 point review of systems was negative    /71   Pulse 78   Temp 97.8  F (36.6  C)   Ht 1.651 m (5' 5\")   Wt 89.8 kg (198 lb)   SpO2 95%   BMI 32.95 kg/m    Physical examination:  Constitutional:  In no acute distress, appears stated age  Eyes:  Extraocular movements intact, no spontaneous nystagmus  Ears:  Both ears examined with an otoscope.   The right ear canal is clear, and the TM is intact. Middle ear aerated.   The left ear canal is clear. TM is intact with a little bit of scarring. The middle ear is aerated. Left posterior incision is well-healed. The posterior skin is slightly retracted into the mastoid cavity. The scalp over the  stimulator is intact and not thinned.   Respiratory:  No increased work of breathing, wheezing or stridor  Musculoskeletal:  Good upper extremity strength  Skin:  No rashes on the head and neck  Neurologic:  House Brackman 1/6 bilaterally, ambulating normally  Psychiatric:  Alert, normal affect, answering questions appropriately    Audiogram:  independently reviewed  (2024) Audiogram  Normal eardrum mobility right, restricted left. Moderately-severe to profound SNHL right ear. Stable re: audio 23.  CNC: Left CI: words: 88%, phonemes: 95%. Right HA: words: 4%, phonemes: 17%  AzBio: Left CI: 95%, Right HA: 9%, Bimodal: 93%, +10 dB SNR: 89%      1815-4716 Audiograms and cochlear implant evaluation was independently reviewed.     Imaging:  Imaging was independently reviewed. Normal temporal bone study.  CT TEMPORAL ORBITAL SELLA W/O " CONTRAST (09/17/2014)  IMPRESSION: Normal CT study of the temporal bones.    Assessment and plan:  Sarahi Sofia is a 48 year old female being seen to discuss a second cochlear implant (right side). She has a severe to profound bilateral hearing loss that is so severe that she receives no benefit from her right hearing aid. A hearing aid trial has been conducted with no benefit. There are measurable auditory thresholds on the right indicating a functioning cochlear nerve. The patient and the family are highly motivated to proceed with implantation and postoperative rehabilitation. Through patient interview during the consultation process, this patient demonstrated the cognitive ability to use auditory clues and a willingness to undergo an extended program of rehabilitation. There is no evidence of middle ear disease. Imaging shows a patent cochlea and cochlear nerve. The devices discussed with the patient are all FDA approved devices. Risks and benefits of implantation were discussed. Risks include:  Expected loss of residual hearing, worsened tinnitus which may improve with activation of the device, dizziness, damage to the taste nerve, damage to the facial nerve, infection with need for explanation and reimplantation, device failure, and possible need for further surgery. It was also discussed that all implant recipients are at greater risk for meningitis and the need for pneumococcal vaccination. She would like to proceed with right cochlear implantation.    Scribe Disclosure:   I, Betty Hargrove, am serving as a scribe; to document services personally performed by Mica Sherman MD -based on data collection and the provider's statements to me.     Provider Disclosure:  I agree with above History, Review of Systems, Physical exam and Plan.  I have reviewed the content of the documentation and have edited it as needed. I have personally performed the services documented here and the documentation accurately  represents those services and the decisions I have made.      Mica Sherman MD

## 2024-04-18 NOTE — NURSING NOTE
"Chief Complaint   Patient presents with    Consult     New cochlear implant . Surgical consult      Blood pressure 103/71, pulse 78, temperature 97.8  F (36.6  C), height 1.651 m (5' 5\"), weight 89.8 kg (198 lb), SpO2 95%.  Placido Corrales LPN    "

## 2024-04-18 NOTE — PATIENT INSTRUCTIONS
You were seen in the ENT Clinic today by Dr. Sherman. If you have any questions or concerns after your appointment, please contact us (see below)    The following has been recommended for you based upon your appointment today:      Please return to clinic     How to Contact Us:  Send a TheraV[employer] message to your provider. Our team will respond to you via TheraVid. Occasionally, we will need to call you to get further information.  For urgent matters (Monday-Friday), call the ENT Clinic: [phone] and speak with a call center team member - they will route your call appropriately.   If you'd like to speak directly with a nurse, please find our contact information below. We do our best to check voicemail frequently throughout the day, and will work to call you back within 1-2 days. For urgent matters, please use the general clinic phone numbers listed above.    Lulú YOUNG, RN, BSN  RN Care Coordinator, ENT Clinic  [facility]  Direct: [phone]  Carol ALAS LPN  Direct: [phone]

## 2024-04-18 NOTE — PROGRESS NOTES
Sarahi Sofia is a new patient to our clinic as she has not been seen since 2014. She is a 48 year old female being seen to discuss a second cochlear implant. She is excited about getting this done. She has done well with the left side which was placed in 2014. She has had no issues with either ear - no otalgia, otorrhea or vertigo.      Past Medical History:   Diagnosis Date    Anemia     Anxiety 2008    Dx 2008    Breathing difficult     Cancer (H) Dec 2022    Squamous cell    Constipations     Depression     Depressive disorder 1994    Dx in 2008    Diarrhea     Headache(784.0)     Hearing loss     Itchy eyes     itchy nose and throat    Nasal congestion     Rash     Rash     Ringing in ears     Sensorineural hearing loss 1994    Bilateral    Sneezing     Snoring     Sore throat        Past Surgical History:   Procedure Laterality Date    broken ankle      IMPLANT COCHLEA WITH NERVE INTEGRITY MONITOR Left 11/13/2014    Procedure: IMPLANT COCHLEA WITH NERVE INTEGRITY MONITOR;  Surgeon: Antwan Silva MD;  Location: [facility] OR    ORTHOPEDIC SURGERY  2009    Ankle fractures    removal of ankle hardware      TONSILLECTOMY      TONSILLECTOMY  1982    Na       Family History   Problem Relation Age of Onset    Diabetes Father         N    Hypertension Father         N    Alcohol/Drug Father     Depression Father     Substance Abuse Father     Diabetes Maternal Grandfather         N    Alcohol/Drug Paternal Grandfather     Cardiovascular Paternal Grandfather     Eye Disorder Brother     Depression Brother     Asthma Brother     Gastrointestinal Disease Brother     Diabetes Brother         N    Asthma Brother     Depression Brother     Substance Abuse Brother        Social History     Tobacco Use    Smoking status: Some Days     Current packs/day: 0.00     Average packs/day: 0.5 packs/day for 18.0 years (9.0 ttl pk-yrs)     Types: Cigarettes     Start date: 11/23/1994     Last attempt to quit: 11/23/2012     Years since  "quittin.4    Smokeless tobacco: Never   Substance Use Topics    Alcohol use: Not Currently     Comment: occ    Drug use: Never       Patient Supplied Answers to Review of Systems      2024     6:27 AM    ENT ROS   Psychology Frequently feeling depressed or sad   The remainder of the 10 point review of systems was negative    /71   Pulse 78   Temp 97.8  F (36.6  C)   Ht 1.651 m (5' 5\")   Wt 89.8 kg (198 lb)   SpO2 95%   BMI 32.95 kg/m    Physical examination:  Constitutional:  In no acute distress, appears stated age  Eyes:  Extraocular movements intact, no spontaneous nystagmus  Ears:  Both ears examined with an otoscope.   The right ear canal is clear, and the TM is intact. Middle ear aerated.   The left ear canal is clear. TM is intact with a little bit of scarring. The middle ear is aerated. Left posterior incision is well-healed. The posterior skin is slightly retracted into the mastoid cavity. The scalp over the  stimulator is intact and not thinned.   Respiratory:  No increased work of breathing, wheezing or stridor  Musculoskeletal:  Good upper extremity strength  Skin:  No rashes on the head and neck  Neurologic:  House Brackman 1/6 bilaterally, ambulating normally  Psychiatric:  Alert, normal affect, answering questions appropriately    Audiogram:  independently reviewed  (2024) Audiogram  Normal eardrum mobility right, restricted left. Moderately-severe to profound SNHL right ear. Stable re: audio 23.  CNC: Left CI: words: 88%, phonemes: 95%. Right HA: words: 4%, phonemes: 17%  AzBio: Left CI: 95%, Right HA: 9%, Bimodal: 93%, +10 dB SNR: 89%      [id number] Audiograms and cochlear implant evaluation was independently reviewed.     Imaging:  Imaging was independently reviewed. Normal temporal bone study.  CT TEMPORAL ORBITAL SELLA W/O CONTRAST (2014)  IMPRESSION: Normal CT study of the temporal bones.    Assessment and plan:  Sarahi Sofia is a 48 year old " female being seen to discuss a second cochlear implant (right side). She has a severe to profound bilateral hearing loss that is so severe that she receives no benefit from her right hearing aid. A hearing aid trial has been conducted with no benefit. There are measurable auditory thresholds on the right indicating a functioning cochlear nerve. The patient and the family are highly motivated to proceed with implantation and postoperative rehabilitation. Through patient interview during the consultation process, this patient demonstrated the cognitive ability to use auditory clues and a willingness to undergo an extended program of rehabilitation. There is no evidence of middle ear disease. Imaging shows a patent cochlea and cochlear nerve. The devices discussed with the patient are all FDA approved devices. Risks and benefits of implantation were discussed. Risks include:  Expected loss of residual hearing, worsened tinnitus which may improve with activation of the device, dizziness, damage to the taste nerve, damage to the facial nerve, infection with need for explanation and reimplantation, device failure, and possible need for further surgery. It was also discussed that all implant recipients are at greater risk for meningitis and the need for pneumococcal vaccination. She would like to proceed with right cochlear implantation.    Scribe Disclosure:   I, Betty Hargrove, am serving as a scribe; to document services personally performed by Mica Sherman MD -based on data collection and the provider's statements to me.     Provider Disclosure:  I agree with above History, Review of Systems, Physical exam and Plan.  I have reviewed the content of the documentation and have edited it as needed. I have personally performed the services documented here and the documentation accurately represents those services and the decisions I have made.

## 2024-04-25 ENCOUNTER — TELEPHONE (OUTPATIENT)
Dept: OTOLARYNGOLOGY | Facility: CLINIC | Age: 49
End: 2024-04-25
Payer: COMMERCIAL

## 2024-04-25 ENCOUNTER — TELEPHONE (OUTPATIENT)
Dept: AUDIOLOGY | Facility: CLINIC | Age: 49
End: 2024-04-25
Payer: COMMERCIAL

## 2024-04-25 NOTE — TELEPHONE ENCOUNTER
Scheduled surgery with Dr. Sherman on 7/12/2024    Spoke with: Patient    Surgery is located at [facility]    Patient will be seen for their H&P by their PCP Ifeoma Jose within 30 days of surgery - Confirmed PCP on file is up to date     Anesthesia type: General    Requested Imaging required for surgery: NA    Patient needs scheduled for their 3 week post op    Patient will receive their surgery packet via mail per their preference - Confirmed address on file is up to date    Patient was not provided a start time for surgery & is aware they will receive this information 2-5 days before surgery    Patient is aware they need a  to & from surgery    Additional comments: Patient will call back if they have any questions or concerns.    Rachel Mcqueen on 4/25/2024 at 2:43 PM

## 2024-04-25 NOTE — TELEPHONE ENCOUNTER
Left patient a voicemail to schedule surgery for INSERTION, IMPLANT, COCHLEAR, WITH INTRAOPERATIVE FACIAL NERVE MONITORING, WITH THE COCHLEAR TopTenREVIEWSS DEVICE with Dr. Sherman - Left Surgery Scheduling line for callback [phone]    Rachel Mcqueen on 4/25/2024 at 2:36 PM

## 2024-04-25 NOTE — TELEPHONE ENCOUNTER
Spoke with Venus to schedule CI post op dates for ENT & audiology. No further questions, reminded appt details are available to view in [facility] if needed.    Briana SHAVER 4/25/24

## 2024-05-09 NOTE — TELEPHONE ENCOUNTER
Cochlear Implant Selection for 7/12/24 surgery at [facility] with Mica Sherman: Right Cochlear [employer] 622 and backup. Externals: 2 N8 processors in sand. 2 additional standard batteries and remote microphone as accessory choices.    Kathie Wadsworth, [facility]  Licensed Audiologist  [place] License #[id number]

## 2024-06-15 ENCOUNTER — HEALTH MAINTENANCE LETTER (OUTPATIENT)
Age: 49
End: 2024-06-15

## 2024-07-11 ENCOUNTER — ANESTHESIA EVENT (OUTPATIENT)
Dept: SURGERY | Facility: AMBULATORY SURGERY CENTER | Age: 49
End: 2024-07-11
Payer: COMMERCIAL

## 2024-07-11 NOTE — ANESTHESIA PREPROCEDURE EVALUATION
Anesthesia Pre-Procedure Evaluation    Patient: Sarahi Sofia   MRN: [id number] : 1975        Procedure : Procedure(s):  INSERTION, IMPLANT, RIGHT COCHLEAR, WITH INTRAOPERATIVE FACIAL NERVE MONITORING, WITH THE COCHLEAR SubimageS DEVICE          Past Medical History:   Diagnosis Date     Anemia      Anxiety 2008    Dx      Breathing difficult      Cancer (H) Dec 2022    Squamous cell     Constipations      Depression      Depressive disorder     Dx in      Diarrhea      Headache(784.0)      Hearing loss      Itchy eyes     itchy nose and throat     Nasal congestion      Rash      Rash      Ringing in ears      Sensorineural hearing loss     Bilateral     Sneezing      Snoring      Sore throat       Past Surgical History:   Procedure Laterality Date     broken ankle       IMPLANT COCHLEA WITH NERVE INTEGRITY MONITOR Left 2014    Procedure: IMPLANT COCHLEA WITH NERVE INTEGRITY MONITOR;  Surgeon: Antwan Silva MD;  Location: [facility] OR     ORTHOPEDIC SURGERY      Ankle fractures     removal of ankle hardware       TONSILLECTOMY       TONSILLECTOMY  1982    Na      Allergies   Allergen Reactions     Amoxicillin      Sulfa Antibiotics       Social History     Tobacco Use     Smoking status: Some Days     Current packs/day: 0.00     Average packs/day: 0.5 packs/day for 18.0 years (9.0 ttl pk-yrs)     Types: Cigarettes     Start date: 1994     Last attempt to quit: 2012     Years since quittin.6     Smokeless tobacco: Never   Substance Use Topics     Alcohol use: Not Currently     Comment: occ      Wt Readings from Last 1 Encounters:   24 89.8 kg (198 lb)        Anesthesia Evaluation   Pt has had prior anesthetic. Type: General.        ROS/MED HX  ENT/Pulmonary:     (+)                tobacco use, Current use,                       Neurologic:  - neg neurologic ROS     Cardiovascular:  - neg cardiovascular ROS     METS/Exercise Tolerance: >4 METS    Hematologic:  -  "neg hematologic  ROS     Musculoskeletal:  - neg musculoskeletal ROS     GI/Hepatic:  - neg GI/hepatic ROS     Renal/Genitourinary:  - neg Renal ROS     Endo:  - neg endo ROS     Psychiatric/Substance Use:  - neg psychiatric ROS     Infectious Disease:  - neg infectious disease ROS     Malignancy:   (+) Malignancy, History of Skin.Skin CA Remission status post Surgery.      Other:  - neg other ROS          Physical Exam    Airway        Mallampati: II   TM distance: > 3 FB   Neck ROM: full   Mouth opening: > 3 cm    Respiratory Devices and Support         Dental       (+) Minor Abnormalities - some fillings, tiny chips      Cardiovascular   cardiovascular exam normal       Rhythm and rate: regular and normal     Pulmonary   pulmonary exam normal        breath sounds clear to auscultation       OUTSIDE LABS:  CBC: No results found for: \"WBC\", \"HGB\", \"HCT\", \"PLT\"  BMP: No results found for: \"NA\", \"POTASSIUM\", \"CHLORIDE\", \"CO2\", \"BUN\", \"CR\", \"GLC\"  COAGS: No results found for: \"PTT\", \"INR\", \"FIBR\"  POC:   Lab Results   Component Value Date    HCG Negative 11/13/2014     HEPATIC: No results found for: \"ALBUMIN\", \"PROTTOTAL\", \"ALT\", \"AST\", \"GGT\", \"ALKPHOS\", \"BILITOTAL\", \"BILIDIRECT\", \"DORI\"  OTHER: No results found for: \"PH\", \"LACT\", \"A1C\", \"VADIM\", \"PHOS\", \"MAG\", \"LIPASE\", \"AMYLASE\", \"TSH\", \"T4\", \"T3\", \"CRP\", \"SED\"    Anesthesia Plan    ASA Status:  2    NPO Status:  NPO Appropriate    Anesthesia Type: General.     - Airway: ETT   Induction: Intravenous, Propofol.   Maintenance: TIVA.        Consents    Anesthesia Plan(s) and associated risks, benefits, and realistic alternatives discussed. Questions answered and patient/representative(s) expressed understanding.     - Discussed:     - Discussed with:  Patient            Postoperative Care    Pain management: Multi-modal analgesia.   PONV prophylaxis: Ondansetron (or other 5HT-3), Background Propofol Infusion     Comments:             Nelsy Gould MD    I have " reviewed the pertinent notes and labs in the chart from the past 30 days and (re)examined the patient.  Any updates or changes from those notes are reflected in this note.

## 2024-07-12 ENCOUNTER — ANESTHESIA (OUTPATIENT)
Dept: SURGERY | Facility: AMBULATORY SURGERY CENTER | Age: 49
End: 2024-07-12
Payer: COMMERCIAL

## 2024-07-12 ENCOUNTER — HOSPITAL ENCOUNTER (OUTPATIENT)
Facility: AMBULATORY SURGERY CENTER | Age: 49
Discharge: HOME | End: 2024-07-12
Attending: OTOLARYNGOLOGY
Payer: COMMERCIAL

## 2024-07-12 ENCOUNTER — ANCILLARY PROCEDURE (OUTPATIENT)
Dept: RADIOLOGY | Facility: AMBULATORY SURGERY CENTER | Age: 49
End: 2024-07-12
Attending: OTOLARYNGOLOGY
Payer: COMMERCIAL

## 2024-07-12 VITALS
OXYGEN SATURATION: 93 % | TEMPERATURE: 97 F | HEART RATE: 85 BPM | BODY MASS INDEX: 32.49 KG/M2 | RESPIRATION RATE: 14 BRPM | DIASTOLIC BLOOD PRESSURE: 79 MMHG | WEIGHT: 195 LBS | SYSTOLIC BLOOD PRESSURE: 112 MMHG | HEIGHT: 65 IN

## 2024-07-12 DIAGNOSIS — G89.18 POSTOPERATIVE PAIN: Primary | ICD-10-CM

## 2024-07-12 DIAGNOSIS — H90.3 SENSORINEURAL HEARING LOSS, BILATERAL: ICD-10-CM

## 2024-07-12 LAB
HCG UR QL: NEGATIVE
INTERNAL QC OK POCT: NORMAL
POCT KIT EXPIRATION DATE: NORMAL
POCT KIT LOT NUMBER: NORMAL

## 2024-07-12 PROCEDURE — 69930 IMPLANT COCHLEAR DEVICE: CPT | Performed by: NURSE ANESTHETIST, CERTIFIED REGISTERED

## 2024-07-12 PROCEDURE — 69930 IMPLANT COCHLEAR DEVICE: CPT | Mod: RT

## 2024-07-12 PROCEDURE — 69930 IMPLANT COCHLEAR DEVICE: CPT | Performed by: ANESTHESIOLOGY

## 2024-07-12 PROCEDURE — 81025 URINE PREGNANCY TEST: CPT | Performed by: PATHOLOGY

## 2024-07-12 PROCEDURE — L8614 COCHLEAR DEVICE: HCPCS

## 2024-07-12 DEVICE — IMPLANTABLE DEVICE: Type: IMPLANTABLE DEVICE | Site: EAR | Status: FUNCTIONAL

## 2024-07-12 RX ORDER — SODIUM CHLORIDE, SODIUM LACTATE, POTASSIUM CHLORIDE, CALCIUM CHLORIDE 600; 310; 30; 20 MG/100ML; MG/100ML; MG/100ML; MG/100ML
INJECTION, SOLUTION INTRAVENOUS CONTINUOUS
Status: DISCONTINUED | OUTPATIENT
Start: 2024-07-12 | End: 2024-07-12 | Stop reason: HOSPADM

## 2024-07-12 RX ORDER — ACETAMINOPHEN 325 MG/1
975 TABLET ORAL ONCE
Status: DISCONTINUED | OUTPATIENT
Start: 2024-07-12 | End: 2024-07-12 | Stop reason: HOSPADM

## 2024-07-12 RX ORDER — CEFAZOLIN SODIUM 2 G/50ML
2 SOLUTION INTRAVENOUS
Status: COMPLETED | OUTPATIENT
Start: 2024-07-12 | End: 2024-07-12

## 2024-07-12 RX ORDER — DEXMEDETOMIDINE HYDROCHLORIDE 4 UG/ML
INJECTION, SOLUTION INTRAVENOUS PRN
Status: DISCONTINUED | OUTPATIENT
Start: 2024-07-12 | End: 2024-07-12

## 2024-07-12 RX ORDER — OXYCODONE HYDROCHLORIDE 5 MG/1
5 TABLET ORAL
Status: COMPLETED | OUTPATIENT
Start: 2024-07-12 | End: 2024-07-12

## 2024-07-12 RX ORDER — LIDOCAINE 40 MG/G
CREAM TOPICAL
Status: DISCONTINUED | OUTPATIENT
Start: 2024-07-12 | End: 2024-07-12 | Stop reason: HOSPADM

## 2024-07-12 RX ORDER — DEXAMETHASONE SODIUM PHOSPHATE 10 MG/ML
10 INJECTION, SOLUTION INTRAMUSCULAR; INTRAVENOUS ONCE
Status: DISCONTINUED | OUTPATIENT
Start: 2024-07-12 | End: 2024-07-12 | Stop reason: HOSPADM

## 2024-07-12 RX ORDER — ACETAMINOPHEN 325 MG/1
650 TABLET ORAL
Status: DISCONTINUED | OUTPATIENT
Start: 2024-07-12 | End: 2024-07-13 | Stop reason: HOSPADM

## 2024-07-12 RX ORDER — GABAPENTIN 300 MG/1
300 CAPSULE ORAL
Status: COMPLETED | OUTPATIENT
Start: 2024-07-12 | End: 2024-07-12

## 2024-07-12 RX ORDER — DEXAMETHASONE SODIUM PHOSPHATE 10 MG/ML
4 INJECTION, SOLUTION INTRAMUSCULAR; INTRAVENOUS
Status: DISCONTINUED | OUTPATIENT
Start: 2024-07-12 | End: 2024-07-12 | Stop reason: HOSPADM

## 2024-07-12 RX ORDER — LIDOCAINE HYDROCHLORIDE 20 MG/ML
INJECTION, SOLUTION INFILTRATION; PERINEURAL PRN
Status: DISCONTINUED | OUTPATIENT
Start: 2024-07-12 | End: 2024-07-12

## 2024-07-12 RX ORDER — FENTANYL CITRATE 50 UG/ML
25 INJECTION, SOLUTION INTRAMUSCULAR; INTRAVENOUS EVERY 5 MIN PRN
Status: DISCONTINUED | OUTPATIENT
Start: 2024-07-12 | End: 2024-07-12 | Stop reason: HOSPADM

## 2024-07-12 RX ORDER — OXYCODONE HYDROCHLORIDE 5 MG/1
5 TABLET ORAL EVERY 6 HOURS PRN
Qty: 12 TABLET | Refills: 0 | Status: SHIPPED | OUTPATIENT
Start: 2024-07-12 | End: 2024-07-15

## 2024-07-12 RX ORDER — FENTANYL CITRATE 50 UG/ML
25 INJECTION, SOLUTION INTRAMUSCULAR; INTRAVENOUS
Status: DISCONTINUED | OUTPATIENT
Start: 2024-07-12 | End: 2024-07-13 | Stop reason: HOSPADM

## 2024-07-12 RX ORDER — PROPOFOL 10 MG/ML
INJECTION, EMULSION INTRAVENOUS CONTINUOUS PRN
Status: DISCONTINUED | OUTPATIENT
Start: 2024-07-12 | End: 2024-07-12

## 2024-07-12 RX ORDER — SCOLOPAMINE TRANSDERMAL SYSTEM 1 MG/1
1 PATCH, EXTENDED RELEASE TRANSDERMAL
Status: DISCONTINUED | OUTPATIENT
Start: 2024-07-12 | End: 2024-07-13 | Stop reason: HOSPADM

## 2024-07-12 RX ORDER — ONDANSETRON 2 MG/ML
INJECTION INTRAMUSCULAR; INTRAVENOUS PRN
Status: DISCONTINUED | OUTPATIENT
Start: 2024-07-12 | End: 2024-07-12

## 2024-07-12 RX ORDER — HYDROMORPHONE HYDROCHLORIDE 1 MG/ML
0.4 INJECTION, SOLUTION INTRAMUSCULAR; INTRAVENOUS; SUBCUTANEOUS EVERY 5 MIN PRN
Status: DISCONTINUED | OUTPATIENT
Start: 2024-07-12 | End: 2024-07-12 | Stop reason: HOSPADM

## 2024-07-12 RX ORDER — DEXAMETHASONE SODIUM PHOSPHATE 10 MG/ML
4 INJECTION, SOLUTION INTRAMUSCULAR; INTRAVENOUS
Status: DISCONTINUED | OUTPATIENT
Start: 2024-07-12 | End: 2024-07-13 | Stop reason: HOSPADM

## 2024-07-12 RX ORDER — ONDANSETRON 4 MG/1
4 TABLET, ORALLY DISINTEGRATING ORAL EVERY 30 MIN PRN
Status: DISCONTINUED | OUTPATIENT
Start: 2024-07-12 | End: 2024-07-12 | Stop reason: HOSPADM

## 2024-07-12 RX ORDER — HYDROCODONE BITARTRATE AND ACETAMINOPHEN 5; 325 MG/1; MG/1
1 TABLET ORAL
Status: DISCONTINUED | OUTPATIENT
Start: 2024-07-12 | End: 2024-07-13 | Stop reason: HOSPADM

## 2024-07-12 RX ORDER — ONDANSETRON 4 MG/1
4 TABLET, ORALLY DISINTEGRATING ORAL EVERY 30 MIN PRN
Status: DISCONTINUED | OUTPATIENT
Start: 2024-07-12 | End: 2024-07-13 | Stop reason: HOSPADM

## 2024-07-12 RX ORDER — FENTANYL CITRATE 50 UG/ML
INJECTION, SOLUTION INTRAMUSCULAR; INTRAVENOUS PRN
Status: DISCONTINUED | OUTPATIENT
Start: 2024-07-12 | End: 2024-07-12

## 2024-07-12 RX ORDER — ONDANSETRON 2 MG/ML
4 INJECTION INTRAMUSCULAR; INTRAVENOUS EVERY 30 MIN PRN
Status: DISCONTINUED | OUTPATIENT
Start: 2024-07-12 | End: 2024-07-13 | Stop reason: HOSPADM

## 2024-07-12 RX ORDER — OXYCODONE HYDROCHLORIDE 5 MG/1
10 TABLET ORAL
Status: DISCONTINUED | OUTPATIENT
Start: 2024-07-12 | End: 2024-07-13 | Stop reason: HOSPADM

## 2024-07-12 RX ORDER — ONDANSETRON 2 MG/ML
4 INJECTION INTRAMUSCULAR; INTRAVENOUS EVERY 30 MIN PRN
Status: DISCONTINUED | OUTPATIENT
Start: 2024-07-12 | End: 2024-07-12 | Stop reason: HOSPADM

## 2024-07-12 RX ORDER — NALOXONE HYDROCHLORIDE 0.4 MG/ML
0.1 INJECTION, SOLUTION INTRAMUSCULAR; INTRAVENOUS; SUBCUTANEOUS
Status: DISCONTINUED | OUTPATIENT
Start: 2024-07-12 | End: 2024-07-13 | Stop reason: HOSPADM

## 2024-07-12 RX ORDER — CEFAZOLIN SODIUM 2 G/50ML
2 SOLUTION INTRAVENOUS SEE ADMIN INSTRUCTIONS
Status: DISCONTINUED | OUTPATIENT
Start: 2024-07-12 | End: 2024-07-12 | Stop reason: HOSPADM

## 2024-07-12 RX ORDER — DEXAMETHASONE SODIUM PHOSPHATE 4 MG/ML
INJECTION, SOLUTION INTRA-ARTICULAR; INTRALESIONAL; INTRAMUSCULAR; INTRAVENOUS; SOFT TISSUE PRN
Status: DISCONTINUED | OUTPATIENT
Start: 2024-07-12 | End: 2024-07-12

## 2024-07-12 RX ORDER — NALOXONE HYDROCHLORIDE 0.4 MG/ML
0.1 INJECTION, SOLUTION INTRAMUSCULAR; INTRAVENOUS; SUBCUTANEOUS
Status: DISCONTINUED | OUTPATIENT
Start: 2024-07-12 | End: 2024-07-12 | Stop reason: HOSPADM

## 2024-07-12 RX ORDER — MAGNESIUM HYDROXIDE 1200 MG/15ML
LIQUID ORAL PRN
Status: DISCONTINUED | OUTPATIENT
Start: 2024-07-12 | End: 2024-07-12 | Stop reason: HOSPADM

## 2024-07-12 RX ORDER — HYDROMORPHONE HYDROCHLORIDE 1 MG/ML
0.2 INJECTION, SOLUTION INTRAMUSCULAR; INTRAVENOUS; SUBCUTANEOUS EVERY 5 MIN PRN
Status: DISCONTINUED | OUTPATIENT
Start: 2024-07-12 | End: 2024-07-12 | Stop reason: HOSPADM

## 2024-07-12 RX ORDER — FENTANYL CITRATE 50 UG/ML
50 INJECTION, SOLUTION INTRAMUSCULAR; INTRAVENOUS EVERY 5 MIN PRN
Status: DISCONTINUED | OUTPATIENT
Start: 2024-07-12 | End: 2024-07-12 | Stop reason: HOSPADM

## 2024-07-12 RX ORDER — ACETAMINOPHEN 325 MG/1
975 TABLET ORAL ONCE
Status: COMPLETED | OUTPATIENT
Start: 2024-07-12 | End: 2024-07-12

## 2024-07-12 RX ORDER — PROPOFOL 10 MG/ML
INJECTION, EMULSION INTRAVENOUS PRN
Status: DISCONTINUED | OUTPATIENT
Start: 2024-07-12 | End: 2024-07-12

## 2024-07-12 RX ADMIN — SODIUM CHLORIDE, SODIUM LACTATE, POTASSIUM CHLORIDE, CALCIUM CHLORIDE: 600; 310; 30; 20 INJECTION, SOLUTION INTRAVENOUS at 11:55

## 2024-07-12 RX ADMIN — LIDOCAINE HYDROCHLORIDE 100 MG: 20 INJECTION, SOLUTION INFILTRATION; PERINEURAL at 09:23

## 2024-07-12 RX ADMIN — PROPOFOL 200 MCG/KG/MIN: 10 INJECTION, EMULSION INTRAVENOUS at 09:22

## 2024-07-12 RX ADMIN — ACETAMINOPHEN 975 MG: 325 TABLET ORAL at 07:59

## 2024-07-12 RX ADMIN — PROPOFOL 225 MCG/KG/MIN: 10 INJECTION, EMULSION INTRAVENOUS at 11:21

## 2024-07-12 RX ADMIN — PROPOFOL 250 MCG/KG/MIN: 10 INJECTION, EMULSION INTRAVENOUS at 10:08

## 2024-07-12 RX ADMIN — FENTANYL CITRATE 50 MCG: 50 INJECTION, SOLUTION INTRAMUSCULAR; INTRAVENOUS at 09:30

## 2024-07-12 RX ADMIN — FENTANYL CITRATE 50 MCG: 50 INJECTION, SOLUTION INTRAMUSCULAR; INTRAVENOUS at 09:39

## 2024-07-12 RX ADMIN — OXYCODONE HYDROCHLORIDE 5 MG: 5 TABLET ORAL at 13:10

## 2024-07-12 RX ADMIN — CEFAZOLIN SODIUM 2 G: 2 SOLUTION INTRAVENOUS at 09:15

## 2024-07-12 RX ADMIN — ONDANSETRON 4 MG: 2 INJECTION INTRAMUSCULAR; INTRAVENOUS at 09:30

## 2024-07-12 RX ADMIN — PROPOFOL 250 MCG/KG/MIN: 10 INJECTION, EMULSION INTRAVENOUS at 10:36

## 2024-07-12 RX ADMIN — SODIUM CHLORIDE, SODIUM LACTATE, POTASSIUM CHLORIDE, CALCIUM CHLORIDE: 600; 310; 30; 20 INJECTION, SOLUTION INTRAVENOUS at 08:18

## 2024-07-12 RX ADMIN — DEXMEDETOMIDINE HYDROCHLORIDE 12 MCG: 4 INJECTION, SOLUTION INTRAVENOUS at 09:44

## 2024-07-12 RX ADMIN — DEXAMETHASONE SODIUM PHOSPHATE 10 MG: 4 INJECTION, SOLUTION INTRA-ARTICULAR; INTRALESIONAL; INTRAMUSCULAR; INTRAVENOUS; SOFT TISSUE at 09:29

## 2024-07-12 RX ADMIN — PROPOFOL 200 MG: 10 INJECTION, EMULSION INTRAVENOUS at 09:23

## 2024-07-12 RX ADMIN — Medication 0.5 MG: at 09:15

## 2024-07-12 RX ADMIN — SCOLOPAMINE TRANSDERMAL SYSTEM 1 PATCH: 1 PATCH, EXTENDED RELEASE TRANSDERMAL at 08:34

## 2024-07-12 RX ADMIN — GABAPENTIN 300 MG: 300 CAPSULE ORAL at 07:59

## 2024-07-12 ASSESSMENT — LIFESTYLE VARIABLES: TOBACCO_USE: 1

## 2024-07-12 NOTE — DISCHARGE INSTRUCTIONS
"1. Resume your home medications. Take pain medication, Tylenol or ibuprofen as needed. Use docusate to avoid constipation.    2. Wound care  * You can remove your head dressing tomorrow.     3. No shower until 48 hours after surgery. Keep incision clean and dry, do not scrub, let water and soap run over incision.    4. For the next week, no heavy lifting more than 5 pounds, no strenuous activities. No nose blowing. Sneeze with your mouth open.    5. Please call MD or come to the ED for shortness of breath, trouble breathing, inability to tolerate liquids, intractable dizziness, or signs of infection such as fevers or redness.      Call the [phone] clinic number if you have any questions and concerns during the day and call [phone] at night and ask for \"ENT resident on call\".     6. Follow up with Dr. Sherman as previously scheduled.      [facility]  Home Care Following Anesthesia  For 24 hours after surgery:  Get plenty of rest.  A responsible adult must stay with you for at least 24 hours after you leave the surgery center.  Do not drive or use heavy equipment.  If you have weakness or tingling, don't drive or use heavy equipment until this feeling goes away.   Do not drink alcohol.   Avoid strenuous or risky activities.  Ask for help when climbing stairs.  You may feel lightheaded.  IF so, sit for a few minutes before standing.  Have someone help you get up.   If you have nausea (feel sick to your stomach): Drink only clear liquids such as apple juice, ginger ale, broth or 7-Up.  Rest may also help.  Be sure to drink enough fluids.  Move to a regular diet as you feel able.   You may have a slight fever.  Call the doctor if your fever is over 100 F (37.7 C) (taken under the tongue) or lasts longer than 24 hours.  You may have a dry mouth, a sore throat, muscle aches or trouble sleeping. These should go away after 24 hours.  Do not make important or legal decisions.   It " is recommended to avoid smoking.     Tips for taking pain medications  To get the best pain relief possible, remember these points:  Take pain medications as directed, before pain becomes severe.  Pain medication can upset your stomach: taking it with food may help.  Constipation is a common side effect of pain medication. Drink plenty of  fluids.  Eat foods high in fiber. Take a stool softener if recommended by your doctor or pharmacist.  Do not drink alcohol, drive or operate machinery while taking pain medications.  Ask about other ways to control pain, such as with heat, ice or relaxation.    Tylenol/Acetaminophen Consumption    If you feel your pain relief is insufficient, you may take Tylenol/Acetaminophen in addition to your narcotic pain medication.   Be careful not to exceed 4,000 mg of Tylenol/Acetaminophen in a 24 hour period from all sources.  If you are taking extra strength Tylenol/acetaminophen (500 mg), the maximum dose is 8 tablets in 24 hours.  If you are taking regular strength acetaminophen (325 mg), the maximum dose is 12 tablets in 24 hours.  You took a dose of Tylenol (Acetaminophen) 975 mg at 8:00 AM.  You can take your next dose of Tylenol at 2:00 PM.      Call a doctor for any of the following:  Signs of infection (fever, growing tenderness at the surgery site, a large amount of drainage or bleeding, severe pain, foul-smelling drainage, redness, swelling).  It has been over 8 to 10 hours since surgery and you are still not able to urinate (pass water).  Headache for over 24 hours.  Numbness, tingling or weakness the day after surgery (if you had spinal anesthesia).  Signs of Covid-19 infection (temperature over 100 degrees, shortness of breath, cough, loss of taste/smell, generalized body aches, persistent headache, chills, sore throat, nausea/vomiting/diarrhea)  Your doctor is:  Dr. Mica Sherman, ENT Otolaryngology: [phone]                    Or dial [phone] and ask for the  resident on call for:  ENT Otolaryngology  For emergency care, call the:  [place] [facility]:  [phone] (TTY for hearing impaired: [phone])

## 2024-07-12 NOTE — BRIEF OP NOTE
[facility]    Brief Operative Note    Pre-operative diagnosis: Sensorineural hearing loss, bilateral [H90.3]  Post-operative diagnosis Same as pre-operative diagnosis    Procedure: INSERTION, IMPLANT, RIGHT COCHLEAR, WITH INTRAOPERATIVE FACIAL NERVE MONITORING, WITH THE COCHLEAR [employer] DEVICE, Right - Ear    Surgeon: Surgeons and Role:     * Mica Sherman MD - Primary     * Teri Joshi MD - Fellow - Assisting  Anesthesia: General   Estimated Blood Loss: Less than 50 ml    Drains: None  Specimens: * No specimens in log *  Findings:   Cochlear 622 CI placed on the right, mastoid well-aerated and facial nerve identified and with no unusual anatomy .  Complications: None.  Implants:   Implant Name Type Inv. Item Serial No.  Lot No. LRB No. Used Action   Cochlear Nucleus  Cochlear Implant with Slim Straight electrode   [id number]  Right 1 Implanted

## 2024-07-12 NOTE — ANESTHESIA PROCEDURE NOTES
Airway       Patient location during procedure: OR  Staff -        CRNA: Serena Mays APRN CRNA       Performed By: CRNA  Consent for Airway        Urgency: elective  Indications and Patient Condition       Indications for airway management: ashwini-procedural       Induction type:intravenous       Mask difficulty assessment: 1 - vent by mask    Final Airway Details       Final airway type: endotracheal airway       Successful airway: ETT - single  Endotracheal Airway Details        ETT size (mm): 7.0       Cuffed: yes       Successful intubation technique: direct laryngoscopy       DL Blade Type: MAC 3       Grade View of Cords: 1       Adjucts: stylet       Position: Left       Bite block used: None    Post intubation assessment        Placement verified by: capnometry, equal breath sounds and chest rise        Number of attempts at approach: 1       Secured with: tape       Ease of procedure: easy       Dentition: Intact and Unchanged

## 2024-07-12 NOTE — ANESTHESIA CARE TRANSFER NOTE
Patient: Sarahi Sofia    Procedure: Procedure(s):  INSERTION, IMPLANT, RIGHT COCHLEAR, WITH INTRAOPERATIVE FACIAL NERVE MONITORING, WITH THE COCHLEAR AMERICAS DEVICE       Diagnosis: Sensorineural hearing loss, bilateral [H90.3]  Diagnosis Additional Information: No value filed.    Anesthesia Type:   No value filed.     Note:    Oropharynx: spontaneously breathing  Level of Consciousness: drowsy  Oxygen Supplementation: face mask  Level of Supplemental Oxygen (L/min / FiO2): 8  Independent Airway: airway patency satisfactory and stable  Dentition: dentition unchanged  Vital Signs Stable: post-procedure vital signs reviewed and stable  Report to RN Given: handoff report given  Patient transferred to: PACU    Handoff Report: Identifed the Patient, Identified the Reponsible Provider, Reviewed the pertinent medical history, Discussed the surgical course, Reviewed Intra-OP anesthesia mangement and issues during anesthesia, Set expectations for post-procedure period and Allowed opportunity for questions and acknowledgement of understanding  Vitals:  Vitals Value Taken Time   /60 07/12/24 1208   Temp     Pulse 94 07/12/24 1209   Resp 24 07/12/24 1209   SpO2 94 % 07/12/24 1209   Vitals shown include unfiled device data.    Electronically Signed By: ANNA Munoz CRNA  July 12, 2024  12:11 PM

## 2024-07-12 NOTE — ANESTHESIA POSTPROCEDURE EVALUATION
Patient: Sarahi Sofia    Procedure: Procedure(s):  INSERTION, IMPLANT, RIGHT COCHLEAR, WITH INTRAOPERATIVE FACIAL NERVE MONITORING, WITH THE COCHLEAR AMERICAS DEVICE       Anesthesia Type:  General    Note:  Disposition: Outpatient   Postop Pain Control: Uneventful            Sign Out: Well controlled pain   PONV: No   Neuro/Psych: Uneventful            Sign Out: Acceptable/Baseline neuro status   Airway/Respiratory: Uneventful            Sign Out: Acceptable/Baseline resp. status   CV/Hemodynamics: Uneventful            Sign Out: Acceptable CV status; No obvious hypovolemia; No obvious fluid overload   Other NRE: NONE   DID A NON-ROUTINE EVENT OCCUR? No       Last vitals:  Vitals Value Taken Time   /71 07/12/24 1250   Temp 36.1  C (97  F) 07/12/24 1250   Pulse 80 07/12/24 1250   Resp 18 07/12/24 1250   SpO2 96 % 07/12/24 1250       Electronically Signed By: Nelys Gould MD  July 12, 2024  1:09 PM

## 2024-07-12 NOTE — OP NOTE
Date of service:  7/12/24    Preoperative diagnosis:  Bilateral sensorineural hearing loss     Postoperative diagnosis:  Bilateral sensorineural hearing loss     Procedure:    1.  Right cochlear implantation with the Cochlear 622 device  2.  Facial nerve monitoring x 2.5 hours    Surgeon:  Mica Sherman MD    Fellow:  Teri Joshi MD    Resident:  Akiko Don MD    Anesthesia:  General    EBL:  20cc    Specimens:  None    Complications:  None    Findings:  Full insertion completed without resistance.    Indications: Sarahi Sofia has a history of bilateral profound sensorineural hearing loss. She was evaluated and found to be a good cochlear implant candidate She had a left cochlear implant placed in 2014 and desired a right implant. The risks and benefits were discussed with the patient and informed consent was obtained.     Procedure: The patient was taken to the operating room, placed supine on the operating room table and general anesthesia was induced. The patient was orally intubated without incident.  Time Out was then performed with confirmation of the patient, site and procedure.   Facial nerve electrodes were placed on the right face and connected to the nerve monitor. Impedances were checked and the nerve was monitored for the entirety of the case. 1:100,000 epinephrine was injected in the postauricular region and the area was prepped and draped in standard surgical fashion. Monopolar cautery was not used. S-shaped postauricular incision was made and carried down to the temporalis fascia. Palva flap was created. A pocket was made under the temporalis muscle and a well drilled for the  stimulator. 2-0 Prolene was sutured to the temporalis periosteum superiorly and sutured to the periosteum inferiorly. A simple mastoidectomy was performed with identification of the tegmen, sigmoid sinus and antrum. Once the antrum was identified, the operating microscope was brought into position  and used for the remainder of the case. The short process of the incus was identified and the facial nerve in its descending portion was identified. The facial recess was opened and the chorda tympani was preserved. The round window niche was identified. A trough was drilled from the well to the mastoid. The entire area was irrigated with saline. The round window niche was drilled off to expose the round window membrane. The device was passed onto the field and the  stimulator placed into the well under the temporalis and tied into position. The ground electrode was placed under the temporalis muscle. The round window was opened with a pick and round window rasp taking care not to suction within the cochlea. The electrode was then passed into the cochlea. No resistance was felt and full insertion was performed. The round window was packed with fascia and the electrode coiled into the mastoid cavity. The Palva flap was closed over the electrode and the skin closed in multiple layers. Intraoperative skull x-ray was done which showed a coil within the otic capsule. A Enrico dressing was placed and the facial nerve electrodes removed. The patient tolerated the procedure well and there were no complications. She was returned to anesthesia, awakened and taken to the PACU in stable condition.     Mica MCNAIR MD, was scrubbed during the entire procedure.

## 2024-07-16 PROBLEM — Z96.21 COCHLEAR IMPLANT IN PLACE: Status: ACTIVE | Noted: 2024-07-16

## 2024-07-27 NOTE — PROGRESS NOTES
[facility] THRO[facility]IN[facility] [address] [address]  Phone: [phone]  Fax: [phone]    Patient:  Sarahi Sofia, Date of birth 1975  Date of Visit:  07/27/2024  Referring Provider Mica Sherman      Assessment & Plan      Healing as expected after cochlear implantation.  Cleared for processor programming.  Follow-up as scheduled with audiology. She will ask audiology if they think     HPI  Sarahi Sofia is here for a postoperative visit after right cochlear implantation; performed on 07/12/2024.  There were no complications during surgery and an expected postoperative course.  Findings:  Full insertion completed without resistance. There is no dizziness.  No pain. She has a left cochlear implant (inserted 2014). She is scheduled for a visit with Audiology on 08/02. She was wondering if she should have accommodations next week for work so she can leave the left implant off for a week.      Physical examination:  female in no acute distress.  Alert and answering questions appropriately.  She is wearing her left implant. HB 1/6 bilaterally.   Incision healing appropriately.  No erythema, edema or fluctuance.  Facial function intact. Right ear examined with an otoscope. There is slight residual hemotympanum.     Scribe Disclosure:   I, Betty Hargrove, am serving as a scribe; to document services personally performed by Mica Sherman MD -based on data collection and the provider's statements to me.     Provider Disclosure:  I agree with above History, Review of Systems, Physical exam and Plan.  I have reviewed the content of the documentation and have edited it as needed. I have personally performed the services documented here and the documentation accurately represents those services and the decisions I have made.

## 2024-07-31 ENCOUNTER — OFFICE VISIT (OUTPATIENT)
Dept: OTOLARYNGOLOGY | Facility: CLINIC | Age: 49
End: 2024-07-31
Payer: COMMERCIAL

## 2024-07-31 VITALS
TEMPERATURE: 97.6 F | OXYGEN SATURATION: 96 % | SYSTOLIC BLOOD PRESSURE: 110 MMHG | DIASTOLIC BLOOD PRESSURE: 73 MMHG | BODY MASS INDEX: 31.32 KG/M2 | HEIGHT: 65 IN | HEART RATE: 72 BPM | WEIGHT: 188 LBS

## 2024-07-31 DIAGNOSIS — H90.3 SENSORINEURAL HEARING LOSS, BILATERAL: Primary | ICD-10-CM

## 2024-07-31 PROCEDURE — 99024 POSTOP FOLLOW-UP VISIT: CPT | Performed by: OTOLARYNGOLOGY

## 2024-07-31 ASSESSMENT — PAIN SCALES - GENERAL: PAINLEVEL: NO PAIN (0)

## 2024-07-31 NOTE — PATIENT INSTRUCTIONS
You were seen in the ENT Clinic today by Dr. Sherman. If you have any questions or concerns after your appointment, please contact us (see below)        Please return to clinic as needed    How to Contact Us:  Send a [employer] message to your provider. Our team will respond to you via [employer]. Occasionally, we will need to call you to get further information.  For urgent matters (Monday-Friday), call the ENT Clinic: [phone] and speak with a call center team member - they will route your call appropriately.   If you'd like to speak directly with a nurse, please find our contact information below. We do our best to check voicemail frequently throughout the day, and will work to call you back within 1-2 days. For urgent matters, please use the general clinic phone numbers listed above.    Lulú YOUNG, RN, BSN  RN Care Coordinator, ENT Clinic  [facility]  Direct: [phone]  Carol ALAS LPN  Direct: [phone]

## 2024-07-31 NOTE — Clinical Note
7/31/2024       RE: Sarahi Sofia  [address]  [place] [address]     Dear Colleague,    Thank you for referring your patient, Sarahi Sofia, to the [facility]SE [facility]AT [facility]IN[facility] [place] [facility]. Please see a copy of my visit note below.      [facility]SE [facility] THRO[facility] CLIN[facility] [address] FLO[address]  [facility] [place] [address]  Phone: [phone]  Fax: [phone]    Patient:  Sarahi Sofia, Date of birth 1975  Date of Visit:  07/27/2024  Referring Provider Mica Sherman      Assessment & Plan     ***Healing as expected after cochlear implantation.  Cleared for processor programming.  Follow-up as scheduled with audiology.     HPI  Sarahi Sofia is here for a postoperative visit after right cochlear implantation; performed on 07/12/2024.  There were no complications during surgery and an expected postoperative course.  Findings:  Full insertion completed without resistance. There {IS /IS NO:410370} dizziness.  {NO/MINIMAL/MODERATE/FULL:488717} pain. She has a prior history of bilateral sensorineural hearing loss, and has a left cochlear implant (inserted 2014). She is scheduled for a visit with Audiology on 08/02.     There were no vitals taken for this visit.   Physical examination:  female in no acute distress.  Alert and answering questions appropriately.  HB 1/6 bilaterally.   Incision healing appropriately.  No erythema, edema or fluctuance.  Facial function intact.  There {IS /IS NO:385560} hemotympanum. {RIGHT, LEFT-INITIAL CAP:5607} ears examined under the microscope.    Audiogram:  Audiogram was independently reviewed. ***    Scribe Disclosure:   I, Betty Hargrove, am serving as a scribe; to document services personally performed by Mica Sherman MD -based on data collection and the provider's statements to me.     Provider Disclosure:  I agree with above History, Review  of Systems, Physical exam and Plan.  I have reviewed the content of the documentation and have edited it as needed. I have personally performed the services documented here and the documentation accurately represents those services and the decisions I have made.      Electronically signed by:  ***        [facility] EAR NOSE AND THROAT CLIN[facility] [address] [address]  Phone: [phone]  Fax: [phone]    Patient:  Sarahi Sofia, Date of birth 1975  Date of Visit:  07/27/2024  Referring Provider Mica Sherman      Assessment & Plan     Healing as expected after cochlear implantation.  Cleared for processor programming.  Follow-up as scheduled with audiology. She will ask audiology if they think     HPI  Sarahi Sofia is here for a postoperative visit after right cochlear implantation; performed on 07/12/2024.  There were no complications during surgery and an expected postoperative course.  Findings:  Full insertion completed without resistance. There is no dizziness.  No pain. She has a left cochlear implant (inserted 2014). She is scheduled for a visit with Audiology on 08/02. She was wondering if she should have accommodations next week for work so she can leave the left implant off for a week.      Physical examination:  female in no acute distress.  Alert and answering questions appropriately.  She is wearing her left implant. HB 1/6 bilaterally.   Incision healing appropriately.  No erythema, edema or fluctuance.  Facial function intact. Right ear examined with an otoscope. There is slight residual hemotympanum.     Scribe Disclosure:   I, Betty Hargrove, am serving as a scribe; to document services personally performed by Mica Sherman MD -based on data collection and the provider's statements to me.     Provider Disclosure:  I agree with above History, Review of Systems, Physical exam and Plan.  I have reviewed the content of the  documentation and have edited it as needed. I have personally performed the services documented here and the documentation accurately represents those services and the decisions I have made.        Again, thank you for allowing me to participate in the care of your patient.      Sincerely,    Mica Sherman MD

## 2024-07-31 NOTE — NURSING NOTE
"Chief Complaint   Patient presents with    RECHECK     CI post op     Blood pressure 110/73, pulse 72, temperature 97.6  F (36.4  C), height 1.651 m (5' 5\"), weight 85.3 kg (188 lb), SpO2 96%.  Placido Corrales LPN    "

## 2024-08-02 ENCOUNTER — OFFICE VISIT (OUTPATIENT)
Dept: AUDIOLOGY | Facility: CLINIC | Age: 49
End: 2024-08-02
Payer: COMMERCIAL

## 2024-08-02 DIAGNOSIS — H90.3 SENSORINEURAL HEARING LOSS, BILATERAL: Primary | ICD-10-CM

## 2024-08-02 NOTE — PROGRESS NOTES
AUDIOLOGY REPORT    SUBJECTIVE:   Dr. Antwan Silva implanted Sarahi Sofia with a left Cochlear [employer] Nucleus  cochlear implant (CI) on 2014 and Dr. Shreman implanted a right Cochlear 622 cochlear implant on 24 due to bilateral moderately severe to profound sensorineural hearing loss and lack of benefit from hearing aids. The patient is being seen for activation of her right cochlear implant and programming of an upgrade left processor on 24 in the Audiology Clinic at [facility].     Sarahi reports she healed well following surgery. She reports some pain and dizziness for the first week, however it has resolved. She saw Dr. Sherman on Wednesday and was cleared for activation    OBJECTIVE:   Dr. Mica Sherman ordered today's appointment. The patient came to the clinic for adjustment to the programs in the external speech processor and for assessment of the external components of the CI system. These components provide power and data to the internal device. Sound is only heard once the external portion is activated. Postoperative treatment, including device fitting and adjustment, audiologic assessments, and training are required at regular intervals. Testing can include electropsychophysical measures of threshold, comfort, and loudness balancing, which are completed to update the program.    Processor type: N8  Magnet strength: Left: 2, Right 3(I) - No irritation    TEST RESULTS:   Electrode Impedances: Stable and within tolerances  Dataloggin.4 hours of use per day   Neural Response Testing: Did not test, will test at one week  Facial Stimulation: Absent  Tinnitus: Present  Balance Problems: Absent  Pain/Discomfort: Absent  Strategies Tried:  with pulse width of 62    Left: Programs:  18. Home  18. Cafe  18. SCAN    Right: Programs:  19: Population mean map raised in live voice  20: +4  21: +8  22: +12      Number of Channels per  Program: 22    Her left map was converted for use with the N8. She reported good sound quality and volume today.    A population mean map was created for the right and it was raised in live voice until she reported comfort with the volume. She reported she was hearing some speech, however the quality was very robotic. I programmed 4 progressive programs for her to work through over the next week. With both implants on she reported the sound quality and volume were good.    We paired the new processors to her phone. She would like to exchange the Mini Microphone for an Aqua case.      ASSESSMENT:   Right cochlear implant activation. Left processor upgrade.    PLAN:   It is recommended that patient return in one week for programming. Please contact me with any questions regarding these results and recommendations.    Kathie Wadsworth, [facility]  Licensed Audiologist  [place] License #[id number]

## 2024-08-09 ENCOUNTER — OFFICE VISIT (OUTPATIENT)
Dept: AUDIOLOGY | Facility: CLINIC | Age: 49
End: 2024-08-09
Payer: COMMERCIAL

## 2024-08-09 DIAGNOSIS — H90.3 SENSORINEURAL HEARING LOSS, BILATERAL: Primary | ICD-10-CM

## 2024-08-09 NOTE — PROGRESS NOTES
AUDIOLOGY REPORT    SUBJECTIVE:   Dr. Antwan Silva implanted Sarahi Sofia with a left Cochlear Americas Nucleus  cochlear implant (CI) on 2014 and Dr. Sherman implanted a right Cochlear 622 cochlear implant on 24 due to bilateral moderately severe to profound sensorineural hearing loss and lack of benefit from hearing aids. The patient is being seen for programming of her right cochlear implant on 24 in the Audiology Clinic at [facility].     Sarahi reports she progressed to program 4 within a few days. She reported the volume is soft and she feels she is able to understand speech with visual cues. Her incision continues to be sore and she is only able to manage about 8 hours of use per day.    OBJECTIVE:   Dr. Mica Sherman ordered today's appointment. The patient came to the clinic for adjustment to the programs in the external speech processor and for assessment of the external components of the CI system. These components provide power and data to the internal device. Sound is only heard once the external portion is activated. Postoperative treatment, including device fitting and adjustment, audiologic assessments, and training are required at regular intervals. Testing can include electropsychophysical measures of threshold, comfort, and loudness balancing, which are completed to update the program.    Processor type: N8  Magnet strength: Left: 2, Right 4(I), patient was also given a 3(I) and will monitor.    TEST RESULTS:   Electrode Impedances: Stable and within tolerances  Dataloggin.4 hours of use per day   Neural Response Testing: Did not test, will test at one week  Facial Stimulation: Absent  Tinnitus: Present  Balance Problems: Absent  Pain/Discomfort: Absent  Strategies Tried:  with pulse width of 62    Left: Programs:  18. Home  18. Cafe  18. SCAN    Right: Programs:  23: Measured T's and C's- pulse width widened to 50  to remain in compliance  24: +3  25: +6  26: +9      Number of Channels per Program: 20- electrodes 1 and 2 disabled due to non-auditory stimulation    T levels were measured using detection and C levels were measured using loudness scaling. Patient was able to complete task successfully. Both T and C levels increased significantly across the array. In live voice she reported the sound quality was very high pitched but she was understanding my voice better. With the left processor on she reported balance.       ASSESSMENT:   Right cochlear implant programming    PLAN:   It is recommended that patient return in three weeks for programming. Please contact me with any questions regarding these results and recommendations.    Kathie Wadsworth, [facility]  Licensed Audiologist  [place] License #[id number]

## 2024-09-11 ENCOUNTER — OFFICE VISIT (OUTPATIENT)
Dept: AUDIOLOGY | Facility: CLINIC | Age: 49
End: 2024-09-11
Payer: COMMERCIAL

## 2024-09-11 DIAGNOSIS — H90.3 SENSORINEURAL HEARING LOSS, BILATERAL: Primary | ICD-10-CM

## 2024-09-11 NOTE — PROGRESS NOTES
"AUDIOLOGY REPORT    SUBJECTIVE:   Dr. Antwan Silva implanted Sarahi Sofia with a left Cochlear [employer] Nucleus  cochlear implant (CI) on 2014 and Dr. Sherman implanted a right Cochlear 622 cochlear implant on 24 due to bilateral moderately severe to profound sensorineural hearing loss and lack of benefit from hearing aids. The patient is being seen for programming of her right cochlear implant on 24 in the Audiology Clinic at [facility].     Sarahi reports she progressed to program 4 within a few days. She reports that the sound quality still has a \"helium\" quality and she needs more volume. With both ears together things sound better.    OBJECTIVE:   Dr. Mica Sherman ordered today's appointment. The patient came to the clinic for adjustment to the programs in the external speech processor and for assessment of the external components of the CI system. These components provide power and data to the internal device. Sound is only heard once the external portion is activated. Postoperative treatment, including device fitting and adjustment, audiologic assessments, and training are required at regular intervals. Testing can include electropsychophysical measures of threshold, comfort, and loudness balancing, which are completed to update the program.    Processor type: N8  Magnet strength: Left: 2, Right 4(I), patient was also given a 3(I) and will monitor.    TEST RESULTS:   Electrode Impedances: Stable and within tolerances  Dataloggin.4 hours of use per day   Neural Response Testing: Did not test=  Facial Stimulation: Absent  Tinnitus: Present  Balance Problems: Absent  Pain/Discomfort: Absent  Strategies Tried:  with pulse width of 62    Left: Programs:  18. Home  18. Cafe  18. SCAN    Right: Programs:  27: Measured T's and C's- pulse width widened to 50 to remain in compliance  28: +3  29: +6  30: +9      Number of Channels per " Program: 20- electrodes 1 and 2 disabled due to non-auditory stimulation    T levels were measured using detection and C levels were measured using loudness scaling. Patient was able to complete task successfully. Both T and C levels increased significantly across the array. In live voice she reported the sound quality and volume were good. We gave her progressive programs again in case she needs more volume before her next appointment       ASSESSMENT:   Right cochlear implant programming    PLAN:   It is recommended that patient return in two months for programming. Please contact me with any questions regarding these results and recommendations    ELLIE Peralta.   Audiology [employer]  [place] # [id number]     I was present with the patient for the entire audiology appointment including all procedures/testing performed by the AuD student, and agree with the student's assessment and plan as documented.       Kathie Wadsworth, [facility]  Licensed Audiologist  [place] License #[id number]

## 2024-11-06 ENCOUNTER — OFFICE VISIT (OUTPATIENT)
Dept: AUDIOLOGY | Facility: CLINIC | Age: 49
End: 2024-11-06
Payer: COMMERCIAL

## 2024-11-06 DIAGNOSIS — H90.3 SENSORINEURAL HEARING LOSS, BILATERAL: Primary | ICD-10-CM

## 2024-11-06 NOTE — PROGRESS NOTES
AUDIOLOGY REPORT    SUBJECTIVE:   Dr. Antwan Silva implanted Sarahi Sofia with a left Cochlear [employer] Nucleus  cochlear implant (CI) on 11/13/2014 and Dr. Sherman implanted a right Cochlear 622 cochlear implant on 7/12/24 due to bilateral moderately severe to profound sensorineural hearing loss and lack of benefit from hearing aids. The patient is being seen for speech perception testing and programming of her right cochlear implant on 11/6/24 in the Audiology Clinic at [facility].     Sarahi reports she is hearing very well. She is doing some isolation of the right implant and feels it's on the soft side.     OBJECTIVE:   Speech perception testing is conducted at regular intervals to determine the degree of benefit the patient is obtaining from the CI. Tests are conducted using the CI without the benefit of lipreading. All tests are conducted in a sound-treated room. Perception of monosyllabic words and words in sentences are tested. Results usually show improvement over time. A decrease in performance indicates the need for re-programming of the prosthesis and/or replacement of components.     INTERVAL: Left: 10 years, Right: 3 months    TEST RESULTS: 40 minutes were spent assessing the patient s auditory rehabilitation status.    Devices used for Testing:   Right ear: N8  Left ear: N8     Tympanograms:   Right: Normal  Left: Restricted mobility consistent with past recordings and no otologic symptoms     Soundfield Aided Thresholds: Detection in borderline normal to mild range bilaterally    CNC Words Test:  The patient repeats 25 single syllable words, auditory only. The words are presented at 60 dB SPL (conversational level) delivered from a CD player.    Preoperative Performance:  Left: 4% words, 21% phonemes  Right: 4% words, 19% phonemes    9 years Post-Activation of left CI (2/22/24):  Left CI: words: 88%, phonemes: 95%  Right HA: words: 4%,  phonemes: 17%    10 years Post-Activation of left CI, 3 months Post-Activation of right CI (today):  Left: words: 84%, phonemes: 93%  Right: words: 60%, phonemes: 68%    [employer] Sentences Test:  The patient repeats 20 sentences, auditory only. The sentences are presented at 60 dB SPL (conversational level) delivered from a CD player.     Preoperative Performance:  Left: 4%  Right: 20%  Bilateral: 43% in quiet, 27% in +10 dB SNR background babble      9 years Post-Activation of left CI (24):  Left: 95%  Right HA: 9%  Bimodal: 93%, +10 dB SNR: 89%    10 years Post-Activation of left CI, 3 months Post-Activation of right CI (today):  Left: 95%  Right: 85%  Bimodal: +10 dB SNR: 75%    Dr. Mica Sherman ordered today's appointment. The patient came to the clinic for adjustment to the programs in the external speech processor and for assessment of the external components of the CI system. These components provide power and data to the internal device. Sound is only heard once the external portion is activated. Postoperative treatment, including device fitting and adjustment, audiologic assessments, and training are required at regular intervals. Testing can include electropsychophysical measures of threshold, comfort, and loudness balancing, which are completed to update the program.    Processor type: N8  Magnet strength: Left: 2, Right 4(I), patient was also given a 3(I) and will monitor.    TEST RESULTS:   Electrode Impedances: Stable and within tolerances  Dataloggin.4 hours of use per day   Neural Response Testing: Did not test=  Facial Stimulation: Absent  Tinnitus: Present  Balance Problems: Absent  Pain/Discomfort: Absent  Strategies Tried:  with pulse width of 62    Left: Programs:  18. Home  18. Cafe  18. SCAN    Right: Programs:  31: Home      Number of Channels per Program: 20- electrodes 1 and 2 disabled due to non-auditory stimulation    T and C levels were raised globally by 5 which she reported  sounded better and more balanced.       ASSESSMENT:   Speech perception testing and right cochlear implant programming    PLAN:   It is recommended that patient return in 9 months for annual speech perception testing and programming. Please contact me with any questions regarding these results and recommendations      Kathie Wadsworth, [facility]  Licensed Audiologist  [place] License #[id number]

## 2025-06-21 ENCOUNTER — HEALTH MAINTENANCE LETTER (OUTPATIENT)
Age: 50
End: 2025-06-21

## 2025-08-20 ENCOUNTER — OFFICE VISIT (OUTPATIENT)
Dept: AUDIOLOGY | Facility: CLINIC | Age: 50
End: 2025-08-20
Payer: COMMERCIAL

## 2025-08-20 DIAGNOSIS — H90.3 SENSORINEURAL HEARING LOSS, BILATERAL: Primary | ICD-10-CM

## 2025-08-21 DIAGNOSIS — H90.3 SENSORINEURAL HEARING LOSS, BILATERAL: Primary | ICD-10-CM

## (undated) DEVICE — SOL NACL 0.9% INJ 1000ML BAG 2B1324X

## (undated) DEVICE — PACK EAR CUSTOM ASC

## (undated) DEVICE — SOL NACL 0.9% IRRIG 500ML BOTTLE 2F7123

## (undated) DEVICE — SU VICRYL 3-0 PS-2 27" UND J427H

## (undated) DEVICE — DRAPE MICROSCOPE LEICA 54X120" 09-MK653

## (undated) DEVICE — BUR STRK ROUND DIAMOND 4.0MM COARSE S2 TT DRIVE 5540-013-040

## (undated) DEVICE — ESU GROUND PAD ADULT W/CORD E7507

## (undated) DEVICE — INSTRUMENT WIPE VISIWIPE 581047

## (undated) DEVICE — BUR STRK ROUND DIAMOND 3.0MM COARSE EXT 5540-013-330

## (undated) DEVICE — SPONGE SURGIFOAM 100 1974

## (undated) DEVICE — BUR STRK ROUND DIAMOND 2.0MM COARSE EXT 5540-013-320

## (undated) DEVICE — DRSG GLASSCOCK ADULT S-1000

## (undated) DEVICE — PREP POVIDONE-IODINE 7.5% SCRUB 4OZ BOTTLE MDS093945

## (undated) DEVICE — ESU ELEC BLADE 2.75" COATED/INSULATED E1455

## (undated) DEVICE — BUR STRK ROUND CUTTER 1.5MM 5H EXT 5540-011-315

## (undated) DEVICE — DRAPE IOBAN INCISE 13X13" 6640EZ

## (undated) DEVICE — PREP POVIDONE IODINE SOLUTION 10% 4OZ BOTTLE 29906-004

## (undated) DEVICE — STRAP KNEE/BODY 31143004

## (undated) DEVICE — BLADE CLIPPER SGL USE 9680

## (undated) DEVICE — DRAPE C-ARM W/STRAPS 42X72" 07-CA104

## (undated) DEVICE — DRAPE U SPLIT 74X120" 29440

## (undated) DEVICE — SUCTION MANIFOLD NEPTUNE 2 SYS 4 PORT 0702-020-000

## (undated) DEVICE — GLOVE BIOGEL PI MICRO SZ 6.5 48565

## (undated) DEVICE — BUR STRK ROUND 6.0MM MULTI-FLUTE 4H S2 PIDRIVE 5540-010-060

## (undated) DEVICE — SU PROLENE 2-0 SHDA 36" 8523H

## (undated) DEVICE — DRSG TEGADERM 2 3/8X2 3/4" 1624W

## (undated) DEVICE — BONE WAX 2.5GM W31G

## (undated) DEVICE — NIM ELEC SUBDERMAL NDL 3PAIR/BOX

## (undated) DEVICE — LINEN TOWEL PACK X5 5464

## (undated) DEVICE — SOL WATER IRRIG 500ML BOTTLE 2F7113

## (undated) DEVICE — SU DERMABOND PRINEO 22CM CLR222US

## (undated) DEVICE — BUR STRK ROUND 5.0MM MULTI-FLUTE 5H S2 PIDRIVE 5540-010-050

## (undated) DEVICE — TRAY PREP DRY SKIN SCRUB 067

## (undated) DEVICE — TUBING STRYKER IRRIGATION CASSETTE 5400-050-001

## (undated) RX ORDER — DEXAMETHASONE SODIUM PHOSPHATE 10 MG/ML
INJECTION, SOLUTION INTRAMUSCULAR; INTRAVENOUS
Status: DISPENSED
Start: 2024-07-12

## (undated) RX ORDER — ACETAMINOPHEN 325 MG/1
TABLET ORAL
Status: DISPENSED
Start: 2024-07-12

## (undated) RX ORDER — FENTANYL CITRATE 50 UG/ML
INJECTION, SOLUTION INTRAMUSCULAR; INTRAVENOUS
Status: DISPENSED
Start: 2024-07-12

## (undated) RX ORDER — HYDROMORPHONE HYDROCHLORIDE 1 MG/ML
INJECTION, SOLUTION INTRAMUSCULAR; INTRAVENOUS; SUBCUTANEOUS
Status: DISPENSED
Start: 2024-07-12

## (undated) RX ORDER — GABAPENTIN 300 MG/1
CAPSULE ORAL
Status: DISPENSED
Start: 2024-07-12

## (undated) RX ORDER — SCOLOPAMINE TRANSDERMAL SYSTEM 1 MG/1
PATCH, EXTENDED RELEASE TRANSDERMAL
Status: DISPENSED
Start: 2024-07-12

## (undated) RX ORDER — CEFAZOLIN SODIUM 2 G/50ML
SOLUTION INTRAVENOUS
Status: DISPENSED
Start: 2024-07-12

## (undated) RX ORDER — PROPOFOL 10 MG/ML
INJECTION, EMULSION INTRAVENOUS
Status: DISPENSED
Start: 2024-07-12

## (undated) RX ORDER — OXYCODONE HYDROCHLORIDE 5 MG/1
TABLET ORAL
Status: DISPENSED
Start: 2024-07-12

## (undated) RX ORDER — ONDANSETRON 2 MG/ML
INJECTION INTRAMUSCULAR; INTRAVENOUS
Status: DISPENSED
Start: 2024-07-12